# Patient Record
Sex: MALE | Race: WHITE
[De-identification: names, ages, dates, MRNs, and addresses within clinical notes are randomized per-mention and may not be internally consistent; named-entity substitution may affect disease eponyms.]

---

## 2019-02-14 ENCOUNTER — HOSPITAL ENCOUNTER (EMERGENCY)
Dept: HOSPITAL 19 - COL.ER | Age: 34
Discharge: HOME | End: 2019-02-14
Payer: SELF-PAY

## 2019-02-14 VITALS — TEMPERATURE: 98.9 F

## 2019-02-14 VITALS — HEIGHT: 70.98 IN | WEIGHT: 166.45 LBS | BODY MASS INDEX: 23.3 KG/M2

## 2019-02-14 VITALS — SYSTOLIC BLOOD PRESSURE: 131 MMHG | HEART RATE: 85 BPM | DIASTOLIC BLOOD PRESSURE: 73 MMHG

## 2019-02-14 DIAGNOSIS — F17.220: ICD-10-CM

## 2019-02-14 DIAGNOSIS — Z23: ICD-10-CM

## 2019-02-14 DIAGNOSIS — S81.012A: ICD-10-CM
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
## 2019-02-14 DIAGNOSIS — S71.111A: Primary | ICD-10-CM
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
## 2019-02-24 ENCOUNTER — HOSPITAL ENCOUNTER (EMERGENCY)
Dept: HOSPITAL 19 - COL.ER | Age: 34
Discharge: HOME | End: 2019-02-24
Payer: SELF-PAY
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
## 2019-02-24 DIAGNOSIS — S71.111D: Primary | ICD-10-CM

## 2020-10-16 ENCOUNTER — HOSPITAL ENCOUNTER (OUTPATIENT)
Dept: HOSPITAL 19 - COL.RAD | Age: 35
End: 2020-10-16
Attending: FAMILY MEDICINE
Payer: COMMERCIAL

## 2020-10-16 DIAGNOSIS — N43.3: Primary | ICD-10-CM

## 2021-04-19 ENCOUNTER — HOSPITAL ENCOUNTER (EMERGENCY)
Age: 36
Discharge: HOME | End: 2021-04-20

## 2021-04-19 VITALS
HEIGHT: 70 IN | SYSTOLIC BLOOD PRESSURE: 137 MMHG | HEART RATE: 83 BPM | TEMPERATURE: 98.2 F | WEIGHT: 190 LBS | DIASTOLIC BLOOD PRESSURE: 90 MMHG | OXYGEN SATURATION: 96 % | BODY MASS INDEX: 27.2 KG/M2 | RESPIRATION RATE: 18 BRPM

## 2021-04-19 DIAGNOSIS — K02.9 DENTAL CARIES: Primary | ICD-10-CM

## 2021-04-19 DIAGNOSIS — K04.7 DENTAL ABSCESS: ICD-10-CM

## 2021-04-19 PROCEDURE — 99283 EMERGENCY DEPT VISIT LOW MDM: CPT

## 2021-04-19 RX ORDER — IBUPROFEN 800 MG/1
800 TABLET ORAL EVERY 8 HOURS PRN
Qty: 30 TABLET | Refills: 0 | Status: SHIPPED | OUTPATIENT
Start: 2021-04-19 | End: 2022-10-14

## 2021-04-19 RX ORDER — PENICILLIN V POTASSIUM 250 MG/1
500 TABLET ORAL ONCE
Status: COMPLETED | OUTPATIENT
Start: 2021-04-20 | End: 2021-04-20

## 2021-04-19 RX ORDER — PENICILLIN V POTASSIUM 500 MG/1
500 TABLET ORAL 4 TIMES DAILY
Qty: 28 TABLET | Refills: 0 | Status: SHIPPED | OUTPATIENT
Start: 2021-04-19 | End: 2021-04-26

## 2021-04-19 RX ORDER — IBUPROFEN 800 MG/1
800 TABLET ORAL ONCE
Status: COMPLETED | OUTPATIENT
Start: 2021-04-20 | End: 2021-04-20

## 2021-04-19 ASSESSMENT — PAIN DESCRIPTION - ORIENTATION: ORIENTATION: LEFT

## 2021-04-19 ASSESSMENT — PAIN DESCRIPTION - LOCATION: LOCATION: MOUTH;JAW

## 2021-04-19 ASSESSMENT — PAIN SCALES - GENERAL: PAINLEVEL_OUTOF10: 10

## 2021-04-20 PROCEDURE — 6370000000 HC RX 637 (ALT 250 FOR IP): Performed by: NURSE PRACTITIONER

## 2021-04-20 RX ADMIN — PENICILLIN V POTASIUM 500 MG: 250 TABLET ORAL at 00:01

## 2021-04-20 RX ADMIN — IBUPROFEN 800 MG: 800 TABLET, FILM COATED ORAL at 00:01

## 2021-04-20 ASSESSMENT — PAIN SCALES - GENERAL: PAINLEVEL_OUTOF10: 10

## 2021-04-20 NOTE — ED TRIAGE NOTES
Patient comes to the ED with left sided dental and jaw pain that has been ongoing for a few months now. Patient states that he has been putting off going to the dentist. Patient states the pain as a 10/10 in severity/ Patient reports that nothing has been taken for pain except a few left over amoxicillin. No other complaints at this time.

## 2021-04-21 ASSESSMENT — ENCOUNTER SYMPTOMS
EYE REDNESS: 0
BACK PAIN: 0
NAUSEA: 0
CHEST TIGHTNESS: 0
VOMITING: 0
COUGH: 0
RHINORRHEA: 0

## 2021-04-21 NOTE — ED PROVIDER NOTES
into the lungs every 6 hours as needed for Wheezing., Disp-1 Inhaler, R-3             ALLERGIES     has No Known Allergies. FAMILY HISTORY     He indicated that his mother is alive. He indicated that his father is alive. family history is not on file. SOCIAL HISTORY       Social History     Socioeconomic History    Marital status: Single     Spouse name: Not on file    Number of children: Not on file    Years of education: Not on file    Highest education level: Not on file   Occupational History    Not on file   Social Needs    Financial resource strain: Not on file    Food insecurity     Worry: Not on file     Inability: Not on file    Transportation needs     Medical: Not on file     Non-medical: Not on file   Tobacco Use    Smoking status: Current Some Day Smoker     Types: Cigarettes   Substance and Sexual Activity    Alcohol use: Yes     Comment: rarely    Drug use: No    Sexual activity: Yes     Partners: Female   Lifestyle    Physical activity     Days per week: Not on file     Minutes per session: Not on file    Stress: Not on file   Relationships    Social connections     Talks on phone: Not on file     Gets together: Not on file     Attends [employer] service: Not on file     Active member of club or organization: Not on file     Attends meetings of clubs or organizations: Not on file     Relationship status: Not on file    Intimate partner violence     Fear of current or ex partner: Not on file     Emotionally abused: Not on file     Physically abused: Not on file     Forced sexual activity: Not on file   Other Topics Concern    Not on file   Social History Narrative    Not on file       PHYSICAL EXAM     INITIAL VITALS:  height is 5' 10\" (1.778 m) and weight is 190 lb (86.2 kg). His oral temperature is 98.2 °F (36.8 °C). His blood pressure is 137/90 (abnormal) and his pulse is 83. His respiration is 18 and oxygen saturation is 96%.     Physical Exam  Constitutional: Appearance: He is well-developed. HENT:      Head: Normocephalic and atraumatic. Mouth/Throat:      Dentition: Abnormal dentition. Dental tenderness, gingival swelling and dental caries present. Comments: Extensive dental decay with dental fractures. Erythema at the tooth base with palpable tenderness. Eyes:      Conjunctiva/sclera: Conjunctivae normal.   Cardiovascular:      Rate and Rhythm: Normal rate. Pulmonary:      Effort: Pulmonary effort is normal.   Abdominal:      Palpations: Abdomen is soft. Musculoskeletal: Normal range of motion. Skin:     General: Skin is warm and dry. Capillary Refill: Capillary refill takes less than 2 seconds. Neurological:      Mental Status: He is alert and oriented to person, place, and time. Psychiatric:         Behavior: Behavior normal.         DIFFERENTIAL DIAGNOSIS:   Dental fracture, dental abscess, dental caries. DIAGNOSTIC RESULTS     EKG: All EKG's are interpreted by the Emergency Department Physician who eithersigns or Co-signs this chart in the absence of a cardiologist.        RADIOLOGY: non-plainfilm images(s) such as CT, Ultrasound and MRI are read by the radiologist.  Plain radiographic images are visualized and preliminarily interpreted by the emergency physician unless otherwise stated below. No orders to display         LABS:   Labs Reviewed - No data to display    EMERGENCY DEPARTMENT COURSE:   Vitals:    Vitals:    04/19/21 2318   BP: (!) 137/90   Pulse: 83   Resp: 18   Temp: 98.2 °F (36.8 °C)   TempSrc: Oral   SpO2: 96%   Weight: 190 lb (86.2 kg)   Height: 5' 10\" (1.778 m)         [facility]    Patient was seen in the ER for dental pain. No labs or imaging is necessary. The patient needs to see a dentist.  He has no trismus. Able to handle secretions. No fever. Patient is treated with appropriate oral abx and pain medication.       Medications   penicillin v potassium (VEETID) tablet 500 mg (500 mg Oral Given 4/20/21 0001)   ibuprofen (ADVIL;MOTRIN) tablet 800 mg (800 mg Oral Given 4/20/21 0001)         Patient was seen independently by myself. The patient's final impression and disposition and plan was determined by myself. Strict return precautions and follow up instructions were discussed with the patient prior to discharge, with which the patient agrees. Physical assessment findings, diagnostic testing(s) if applicable, and vital signs reviewed with patient/patient representative. Questions answered. Medications asdirected, including OTC medications for supportive care. Education provided on medications. Differential diagnosis(s) discussed with patient/patient representative. Home care/self care instructions reviewed withpatient/patient representative. Patient is to follow-up with family care provider in 2-3 days if no improvement. Patient is to go to the emergency department if symptoms worsen. Patient/patient representative isaware of care plan, questions answered, verbalizes understanding and is in agreement. CRITICAL CARE:   None    CONSULTS:  None    PROCEDURES:  None    FINAL IMPRESSION     1. Dental caries    2.  Dental abscess          DISPOSITION/PLAN   DISPOSITION Decision To Discharge 04/19/2021 11:55:00 PM      PATIENT REFERREDTO:  DR. Alfonso De Luna OF [address] [place] [facility] [address]  [phone]    Schedule an appointment as soon as possible for a visit in 2 days  For follow up    [facility]  [phone]  Schedule an appointment as soon as possible for a visit in 2 days  For follow up      DISCHARGE MEDICATIONS:  Discharge Medication List as of 4/19/2021 11:58 PM      START taking these medications    Details   penicillin v potassium (VEETID) 500 MG tablet Take 1 tablet by mouth 4 times daily for 7 days, Disp-28 tablet, R-0Print             (Please note that portions of this note were completed with a voice recognition program.  Efforts were made to edit the dictations but occasionally words are mis-transcribed.)         LILIAM Mercado - LILIAM Garnica CNP  04/21/21 9889

## 2021-10-12 ENCOUNTER — HOSPITAL ENCOUNTER (EMERGENCY)
Age: 36
Discharge: HOME | End: 2021-10-12
Payer: COMMERCIAL

## 2021-10-12 VITALS
WEIGHT: 175 LBS | RESPIRATION RATE: 14 BRPM | HEART RATE: 69 BPM | OXYGEN SATURATION: 99 % | DIASTOLIC BLOOD PRESSURE: 71 MMHG | SYSTOLIC BLOOD PRESSURE: 126 MMHG | HEIGHT: 71 IN | TEMPERATURE: 97.9 F | BODY MASS INDEX: 24.5 KG/M2

## 2021-10-12 DIAGNOSIS — J06.9 ACUTE UPPER RESPIRATORY INFECTION: Primary | ICD-10-CM

## 2021-10-12 LAB — SARS-COV-2, NAA: NOT  DETECTED

## 2021-10-12 PROCEDURE — 87635 SARS-COV-2 COVID-19 AMP PRB: CPT

## 2021-10-12 PROCEDURE — 99213 OFFICE O/P EST LOW 20 MIN: CPT

## 2021-10-12 PROCEDURE — 99213 OFFICE O/P EST LOW 20 MIN: CPT | Performed by: NURSE PRACTITIONER

## 2021-10-12 RX ORDER — ESCITALOPRAM OXALATE 10 MG/1
10 TABLET ORAL DAILY
COMMUNITY
End: 2021-11-29 | Stop reason: SDUPTHER

## 2021-10-12 ASSESSMENT — ENCOUNTER SYMPTOMS
SINUS PRESSURE: 0
VOMITING: 1
DIARRHEA: 0
NAUSEA: 1
SHORTNESS OF BREATH: 0
COUGH: 1
SORE THROAT: 0

## 2021-10-12 NOTE — Clinical Note
Bryan Mendiola was seen and treated in our emergency department on 10/12/2021. He may return to work on 10/13/2021. If you have any questions or concerns, please don't hesitate to call.       Tom Luna, APRN - CNP

## 2021-10-12 NOTE — ED NOTES
PT GIVEN DISCHARGE INSTRUCTIONS, VERBALIZES UNDERSTANDING. PT ASSESSMENT UNCHANGED, DISCHARGED IN STABLE CONDITION.         Khadar Bolaños RN  10/12/21 5821

## 2021-10-12 NOTE — ED PROVIDER NOTES
[address]  Urgent Care Encounter       CHIEF COMPLAINT       Chief Complaint   Patient presents with    Concern For COVID-19     girlfriend just tested positive for covid    Cough    Fatigue    Emesis    Taste Change       Nurses Notes reviewed and I agree except as noted in the HPI. HISTORY OF PRESENT ILLNESS   Clarice Mccarty is a 30 y.o. male who presents with concerns for COVID-19. Symptoms started yesterday which include cough, fatigue and alteration in taste. Patient also had 1 episode of emesis. He denies chills but does have some mild body aches. No fever, sore throat or otalgia. States his girlfriend tested positive for Covid yesterday. The history is provided by the patient. REVIEW OF SYSTEMS     Review of Systems   Constitutional: Positive for fatigue. Negative for chills and fever. HENT: Negative for congestion, ear pain, sinus pressure and sore throat. Respiratory: Positive for cough. Negative for shortness of breath. Cardiovascular: Negative for chest pain. Gastrointestinal: Positive for nausea and vomiting. Negative for diarrhea. Musculoskeletal: Positive for myalgias. Skin: Negative for rash. Neurological: Negative for headaches. PAST MEDICAL HISTORY   History reviewed. No pertinent past medical history. SURGICALHISTORY     Patient  has a past surgical history that includes hernia repair.     CURRENT MEDICATIONS       Discharge Medication List as of 10/12/2021  3:49 PM      CONTINUE these medications which have NOT CHANGED    Details   escitalopram (LEXAPRO) 10 MG tablet Take 10 mg by mouth dailyHistorical Med      ibuprofen (ADVIL;MOTRIN) 800 MG tablet Take 1 tablet by mouth every 8 hours as needed for Pain, Disp-30 tablet, R-0Print      traMADol (ULTRAM) 50 MG TABS Take 50 mg by mouth 4 times daily as needed Caution will cause drowsiness, not at work, Disp-12 tablet, R-0      betamethasone valerate (VALISONE) 0.1 % cream Apply topically 2 times daily. , Disp-1 Tube, R-1, Print      albuterol (PROVENTIL HFA) 108 (90 BASE) MCG/ACT inhaler Inhale 2 puffs into the lungs every 6 hours as needed for Wheezing., Disp-1 Inhaler, R-3             ALLERGIES     Patient is has No Known Allergies. Patients   Immunization History   Administered Date(s) Administered    Tdap (Boostrix, Adacel) 12/22/2014       FAMILY HISTORY     Patient's family history is not on file. SOCIAL HISTORY     Patient  reports that he has been smoking cigarettes. He has a 1.50 pack-year smoking history. He has quit using smokeless tobacco. He reports current alcohol use. He reports that he does not use drugs. PHYSICAL EXAM     ED TRIAGE VITALS  BP: 126/71, Temp: 97.9 °F (36.6 °C), Pulse: 69, Resp: 14, SpO2: 99 %,Estimated body mass index is 24.41 kg/m² as calculated from the following:    Height as of this encounter: 5' 11\" (1.803 m). Weight as of this encounter: 175 lb (79.4 kg). ,No LMP for male patient. Physical Exam  Vitals and nursing note reviewed. Constitutional:       General: He is not in acute distress. Appearance: He is well-developed. He is not ill-appearing. HENT:      Head: Normocephalic and atraumatic. Right Ear: Tympanic membrane and ear canal normal.      Left Ear: Tympanic membrane and ear canal normal.      Mouth/Throat:      Lips: Pink. Mouth: Mucous membranes are moist.      Pharynx: Oropharynx is clear. Uvula midline. No posterior oropharyngeal erythema. Eyes:      General: Lids are normal. No scleral icterus. Conjunctiva/sclera: Conjunctivae normal.      Pupils: Pupils are equal.   Cardiovascular:      Rate and Rhythm: Normal rate and regular rhythm. Heart sounds: Normal heart sounds, S1 normal and S2 normal.   Pulmonary:      Effort: Pulmonary effort is normal. No respiratory distress. Breath sounds: Normal breath sounds.    Musculoskeletal:      Comments: Normal active ROM x 4 extremities  Gait steady Lymphadenopathy:      Comments: No head or neck adenopathy   Skin:     General: Skin is warm and dry. Findings: No rash (to exposed skin). Nails: There is no clubbing. Neurological:      General: No focal deficit present. Mental Status: He is alert and oriented to person, place, and time. Sensory: No sensory deficit. Comments: Answers questions readily and appropriately   Psychiatric:         Mood and Affect: Mood normal.         Speech: Speech normal.         Behavior: Behavior normal. Behavior is cooperative. DIAGNOSTIC RESULTS     Labs:  Results for orders placed or performed during the hospital encounter of 10/12/21   COVID-19, Rapid   Result Value Ref Range    SARS-CoV-2, [place] NOT  DETECTED NOT DETECTED       IMAGING:    No orders to display         EKG:      URGENT CARE COURSE:     Vitals:    10/12/21 1510   BP: 126/71   Pulse: 69   Resp: 14   Temp: 97.9 °F (36.6 °C)   TempSrc: Temporal   SpO2: 99%   Weight: 175 lb (79.4 kg)   Height: 5' 11\" (1.803 m)       Medications - No data to display         PROCEDURES:  None    FINAL IMPRESSION      1. Acute upper respiratory infection          DISPOSITION/ PLAN     Patient with acute upper respiratory infection, most likely viral in etiology. Rapid Covid test was negative. Patient can treat with over-the-counter medications as desired for symptom management. Recommend avoiding close contact with his girlfriend as much as possible. Should both wear masks if around each other in the home. Follow-up with family doctor with any concerns. Can also contact the health department with any concerns regarding need for quarantine. Further instructions were outlined verbally and in the patient's discharge instructions. All the patient's questions were answered. The patient/parent agreed with the plan and was discharged from the [facility] in good condition. PATIENT REFERRED TO:  No primary care provider on file.   No primary physician

## 2021-11-29 ENCOUNTER — OFFICE VISIT (OUTPATIENT)
Dept: FAMILY MEDICINE CLINIC | Age: 36
End: 2021-11-29
Payer: COMMERCIAL

## 2021-11-29 VITALS
BODY MASS INDEX: 23.68 KG/M2 | SYSTOLIC BLOOD PRESSURE: 132 MMHG | OXYGEN SATURATION: 98 % | WEIGHT: 165.4 LBS | HEART RATE: 68 BPM | DIASTOLIC BLOOD PRESSURE: 78 MMHG | TEMPERATURE: 96.8 F | HEIGHT: 70 IN

## 2021-11-29 DIAGNOSIS — F41.9 ANXIETY AND DEPRESSION: Primary | ICD-10-CM

## 2021-11-29 DIAGNOSIS — R45.1 AGITATION: ICD-10-CM

## 2021-11-29 DIAGNOSIS — F32.A ANXIETY AND DEPRESSION: Primary | ICD-10-CM

## 2021-11-29 DIAGNOSIS — G43.019 INTRACTABLE MIGRAINE WITHOUT AURA AND WITHOUT STATUS MIGRAINOSUS: ICD-10-CM

## 2021-11-29 PROCEDURE — 99204 OFFICE O/P NEW MOD 45 MIN: CPT | Performed by: NURSE PRACTITIONER

## 2021-11-29 RX ORDER — ESCITALOPRAM OXALATE 10 MG/1
10 TABLET ORAL DAILY
Qty: 30 TABLET | Refills: 0 | Status: SHIPPED | OUTPATIENT
Start: 2021-11-29 | End: 2022-01-05 | Stop reason: SDUPTHER

## 2021-11-29 RX ORDER — ARIPIPRAZOLE 15 MG/1
15 TABLET ORAL DAILY
COMMUNITY
End: 2021-11-29 | Stop reason: SDUPTHER

## 2021-11-29 RX ORDER — ARIPIPRAZOLE 15 MG/1
15 TABLET ORAL DAILY
Qty: 30 TABLET | Refills: 0 | Status: SHIPPED | OUTPATIENT
Start: 2021-11-29 | End: 2022-01-05 | Stop reason: SDUPTHER

## 2021-11-29 SDOH — ECONOMIC STABILITY: FOOD INSECURITY: WITHIN THE PAST 12 MONTHS, YOU WORRIED THAT YOUR FOOD WOULD RUN OUT BEFORE YOU GOT MONEY TO BUY MORE.: NEVER TRUE

## 2021-11-29 SDOH — ECONOMIC STABILITY: FOOD INSECURITY: WITHIN THE PAST 12 MONTHS, THE FOOD YOU BOUGHT JUST DIDN'T LAST AND YOU DIDN'T HAVE MONEY TO GET MORE.: NEVER TRUE

## 2021-11-29 ASSESSMENT — PATIENT HEALTH QUESTIONNAIRE - PHQ9
2. FEELING DOWN, DEPRESSED OR HOPELESS: 1
SUM OF ALL RESPONSES TO PHQ QUESTIONS 1-9: 1
1. LITTLE INTEREST OR PLEASURE IN DOING THINGS: 0
SUM OF ALL RESPONSES TO PHQ QUESTIONS 1-9: 1
SUM OF ALL RESPONSES TO PHQ9 QUESTIONS 1 & 2: 1
SUM OF ALL RESPONSES TO PHQ QUESTIONS 1-9: 1

## 2021-11-29 ASSESSMENT — ENCOUNTER SYMPTOMS
COLOR CHANGE: 0
WHEEZING: 0
BACK PAIN: 0
BLOOD IN STOOL: 0
EYE DISCHARGE: 0
SINUS PRESSURE: 0
COUGH: 0
ABDOMINAL PAIN: 0
CONSTIPATION: 0
EYE ITCHING: 0
SHORTNESS OF BREATH: 0
DIARRHEA: 0
EYE PAIN: 0
EYE REDNESS: 0

## 2021-11-29 ASSESSMENT — SOCIAL DETERMINANTS OF HEALTH (SDOH): HOW HARD IS IT FOR YOU TO PAY FOR THE VERY BASICS LIKE FOOD, HOUSING, MEDICAL CARE, AND HEATING?: NOT HARD AT ALL

## 2021-11-29 NOTE — PROGRESS NOTES
[facility]  [facility]  [address] [facility]  Dept: [phone]  Dept Fax: [phone]: [phone]     Visit Date:  11/29/2021    Patient:  Jose Cabezas  YOB: 1985  Age: 29 y.o. Gender: male  BMI: Body mass index is 23.73 kg/m². Jose Cabezas, New patient, is being seen today for   Chief Complaint   Patient presents with    New Patient    Medication Refill   . Assessment/Plan      1. Anxiety and depression  - Chronic, unstable  - Restart aripiprazole and escitalopram at previous doses  - Consider dose adjustment during 1 month follow up  - ARIPiprazole (ABILIFY) 15 MG tablet; Take 1 tablet by mouth daily  Dispense: 30 tablet; Refill: 0  - escitalopram (LEXAPRO) 10 MG tablet; Take 1 tablet by mouth daily  Dispense: 30 tablet; Refill: 0    2. Agitation  - Chronic, unstable  - Restart aripiprazole and escitalopram at previous doses  - Consider dose adjustment during 1 month follow up  - ARIPiprazole (ABILIFY) 15 MG tablet; Take 1 tablet by mouth daily  Dispense: 30 tablet; Refill: 0    3. Intractable migraine without aura and without status migrainosus  - No occurring frequently enough for prophylactic treatment  - Recommend use of Excedrin at the start of headache symptoms  - Avoid triggers as able  - Encourage adequate sleep and fluid intake, as well as regular meals      Return in about 1 month (around 12/29/2021) for Anxiety, Depression. HPI:     New patient. Previously seeing [facility]. Recently moved from [place]. Working in a factory. Needs medication refills of abilify and lexapro. Stopped medications 2 weeks ago after running out. Reports mood swings, agitation, anxiety and depression. Reports previously being dx with depression and anxiety. States he has never been diagnosed with bipolar disorder but reports associated tendencies. Reports stability of symptoms while taking his medications. Reports headaches occurring twice monthly. Frontal. Bilateral. Associated with sensitivity to light and sound. PHQ-9 Total Score: 1 (11/29/2021 11:34 AM)      Medications    Current Outpatient Medications:     ARIPiprazole (ABILIFY) 15 MG tablet, Take 1 tablet by mouth daily, Disp: 30 tablet, Rfl: 0    escitalopram (LEXAPRO) 10 MG tablet, Take 1 tablet by mouth daily, Disp: 30 tablet, Rfl: 0    ibuprofen (ADVIL;MOTRIN) 800 MG tablet, Take 1 tablet by mouth every 8 hours as needed for Pain, Disp: 30 tablet, Rfl: 0    The patient has No Known Allergies. Past Medical History  Janis Leavitt  has no past medical history on file. Past Surgical History  The patient  has a past surgical history that includes hernia repair. Family History  This patient's family history includes Cancer in his maternal aunt and maternal grandfather; Other in his maternal grandmother and maternal uncle. Social History  Janis Leavitt  reports that he has been smoking cigarettes. He started smoking about 2 years ago. He has a 1.50 pack-year smoking history. He has quit using smokeless tobacco. He reports current alcohol use of about 2.0 standard drinks of alcohol per week. He reports current drug use. Drug: Marijuana Valdene Dominguez). Health Maintenance:    Health maintenance reviewed. Health Maintenance   Topic Date Due    Hepatitis C screen  Never done    Varicella vaccine (1 of 2 - 2-dose childhood series) Never done    COVID-19 Vaccine (1) Never done    Pneumococcal 0-64 years Vaccine (1 of 2 - PPSV23) Never done    HIV screen  Never done    Flu vaccine (1) Never done    DTaP/Tdap/Td vaccine (2 - Td or Tdap) 12/22/2024    Hepatitis A vaccine  Aged Out    Hepatitis B vaccine  Aged Out    Hib vaccine  Aged Out    Meningococcal (ACWY) vaccine  Aged Out       Subjective/Objective:      Review of Systems   Constitutional: Negative for chills, fatigue and fever. HENT: Negative for congestion, ear pain, sinus pressure and sneezing. Eyes: Negative for pain, discharge, redness and itching. Respiratory: Negative for cough, shortness of breath and wheezing. Cardiovascular: Negative for chest pain, palpitations and leg swelling. Gastrointestinal: Negative for abdominal pain, blood in stool, constipation and diarrhea. Endocrine: Negative for polydipsia, polyphagia and polyuria. Genitourinary: Negative for difficulty urinating and hematuria. Musculoskeletal: Negative for arthralgias, back pain and neck pain. Skin: Negative for color change, pallor and rash. Allergic/Immunologic: Negative for environmental allergies and food allergies. Neurological: Positive for headaches. Negative for dizziness, light-headedness and numbness. Psychiatric/Behavioral: Positive for agitation and dysphoric mood. Negative for confusion. The patient is nervous/anxious. /78 (Site: Left Upper Arm, Position: Sitting)   Pulse 68   Temp 96.8 °F (36 °C)   Ht 5' 10\" (1.778 m)   Wt 165 lb 6.4 oz (75 kg)   SpO2 98%   BMI 23.73 kg/m²     Physical Exam  Vitals and nursing note reviewed. Constitutional:       Appearance: He is well-developed. HENT:      Head: Normocephalic and atraumatic. Right Ear: Hearing, tympanic membrane, ear canal and external ear normal.      Left Ear: Hearing, tympanic membrane, ear canal and external ear normal.      Nose:      Right Sinus: No maxillary sinus tenderness or frontal sinus tenderness. Left Sinus: No maxillary sinus tenderness or frontal sinus tenderness. Eyes:      General:         Right eye: No discharge. Left eye: No discharge. Conjunctiva/sclera: Conjunctivae normal.      Pupils: Pupils are equal, round, and reactive to light. Neck:      Vascular: No JVD. Cardiovascular:      Rate and Rhythm: Normal rate and regular rhythm. Heart sounds: Normal heart sounds. No murmur heard. Pulmonary:      Effort: Pulmonary effort is normal. No tachypnea.       Breath sounds: Normal breath sounds. No stridor. No wheezing. Abdominal:      General: There is no distension. Tenderness: There is no abdominal tenderness. Musculoskeletal:         General: No deformity. Cervical back: Normal range of motion. Comments: ROM in all extremities WNL. No deformities. Lymphadenopathy:      Head:      Right side of head: No submental or submandibular adenopathy. Left side of head: No submental or submandibular adenopathy. Skin:     General: Skin is warm and dry. Capillary Refill: Capillary refill takes less than 2 seconds. Findings: No rash. Neurological:      Mental Status: He is alert and oriented to person, place, and time. Coordination: Coordination normal.   Psychiatric:         Mood and Affect: Mood is anxious. Behavior: Behavior normal.         Thought Content: Thought content normal.         Judgment: Judgment normal.           Labs Reviewed 11/29/2021:    Lab Results   Component Value Date    WBC 7.7 11/20/2013    HGB 15.8 11/20/2013    HCT 46.5 11/20/2013     11/20/2013     11/20/2013    K 4.6 11/20/2013     11/20/2013    CREATININE 1.0 11/20/2013    BUN 10 11/20/2013    CO2 29 11/20/2013           Patient given educational materials - see patient instructions. Discussed use, benefit, and side effects of prescribed medications. All patient questions answered. Pt voiced understanding. Reviewed health maintenance.        Electronically signed by LILIAM Hammond CNP on 11/29/2021 at 11:45 AM EST

## 2021-11-29 NOTE — PATIENT INSTRUCTIONS
Patient Education        Anxiety Disorder: Care Instructions  Your Care Instructions     Anxiety is a normal reaction to stress. Difficult situations can cause you to have symptoms such as sweaty palms and a nervous feeling. In an anxiety disorder, the symptoms are far more severe. Constant worry, muscle tension, trouble sleeping, nausea and diarrhea, and other symptoms can make normal daily activities difficult or impossible. These symptoms may occur for no reason, and they can affect your work, school, or social life. Medicines, counseling, and self-care can all help. Follow-up care is a key part of your treatment and safety. Be sure to make and go to all appointments, and call your doctor if you are having problems. It's also a good idea to know your test results and keep a list of the medicines you take. How can you care for yourself at home? · Take medicines exactly as directed. Call your doctor if you think you are having a problem with your medicine. · Go to your counseling sessions and follow-up appointments. · Recognize and accept your anxiety. Then, when you are in a situation that makes you anxious, say to yourself, \"This is not an emergency. I feel uncomfortable, but I am not in danger. I can keep going even if I feel anxious. \"  · Be kind to your body:  ? Relieve tension with exercise or a massage. ? Get enough rest.  ? Avoid alcohol, caffeine, nicotine, and illegal drugs. They can increase your anxiety level and cause sleep problems. ? Learn and do relaxation techniques. See below for more about these techniques. · Engage your mind. Get out and do something you enjoy. Go to a funny movie, or take a walk or hike. Plan your day. Having too much or too little to do can make you anxious. · Keep a record of your symptoms. Discuss your fears with a good friend or family member, or join a support group for people with similar problems. Talking to others sometimes relieves stress.   · Get involved in social groups, or volunteer to help others. Being alone sometimes makes things seem worse than they are. · Get at least 30 minutes of exercise on most days of the week to relieve stress. Walking is a good choice. You also may want to do other activities, such as running, swimming, cycling, or playing tennis or team sports. Relaxation techniques  Do relaxation exercises 10 to 20 minutes a day. You can play soothing, relaxing music while you do them, if you wish. · Tell others in your house that you are going to do your relaxation exercises. Ask them not to disturb you. · Find a comfortable place, away from all distractions and noise. · Lie down on your back, or sit with your back straight. · Focus on your breathing. Make it slow and steady. · Breathe in through your nose. Breathe out through either your nose or mouth. · Breathe deeply, filling up the area between your navel and your rib cage. Breathe so that your belly goes up and down. · Do not hold your breath. · Breathe like this for 5 to 10 minutes. Notice the feeling of calmness throughout your whole body. As you continue to breathe slowly and deeply, relax by doing the following for another 5 to 10 minutes:  · Tighten and relax each muscle group in your body. You can begin at your toes and work your way up to your head. · Imagine your muscle groups relaxing and becoming heavy. · Empty your mind of all thoughts. · Let yourself relax more and more deeply. · Become aware of the state of calmness that surrounds you. · When your relaxation time is over, you can bring yourself back to alertness by moving your fingers and toes and then your hands and feet and then stretching and moving your entire body. Sometimes people fall asleep during relaxation, but they usually wake up shortly afterward. · Always give yourself time to return to full alertness before you drive a car or do anything that might cause an accident if you are not fully alert.  Never play a relaxation tape while you drive a car. When should you call for help? Call 911 anytime you think you may need emergency care. For example, call if:    · You feel you cannot stop from hurting yourself or someone else. Keep the numbers for these national suicide hotlines: 1-978-893-TALK (2-185.628.9344) and 7-990-VDPXWXP (6-139.582.2037). If you or someone you know talks about suicide or feeling hopeless, get help right away. Watch closely for changes in your health, and be sure to contact your doctor if:    · You have anxiety or fear that affects your life.     · You have symptoms of anxiety that are new or different from those you had before. Where can you learn more? Go to [email or web address]://SamanagepeParcell Laboratorieseb.360incentives.com. org and sign in to your Benaissance account. Enter P754 in the Responsa box to learn more about \"Anxiety Disorder: Care Instructions. \"     If you do not have an account, please click on the \"Sign Up Now\" link. Current as of: September 23, 2020               Content Version: 12.9  © 2006-2021 ParAccel. Care instructions adapted under license by [facility] ([facility]). If you have questions about a medical condition or this instruction, always ask your healthcare professional. [address] any warranty or liability for your use of this information. Patient Education        Depression and Chronic Disease: Care Instructions  Your Care Instructions     A chronic disease is one that you have for a long time. Some chronic diseases can be controlled, but they usually cannot be cured. Depression is common in people with chronic diseases, but it often goes unnoticed. Many people have concerns about seeking treatment for a mental health problem. You may think it's a sign of weakness, or you don't want people to know about it. It's important to overcome these reasons for not seeking treatment.  Treating depression or anxiety is good for your health. Follow-up care is a key part of your treatment and safety. Be sure to make and go to all appointments, and call your doctor if you are having problems. It's also a good idea to know your test results and keep a list of the medicines you take. How can you care for yourself at home? Watch for symptoms of depression  The symptoms of depression are often subtle at first. You may think they are caused by your disease rather than depression. Or you may think it is normal to be depressed when you have a chronic disease. If you are depressed you may:  · Feel sad or hopeless. · Feel guilty or worthless. · Not enjoy the things you used to enjoy. · Feel hopeless, as though life is not worth living. · Have trouble thinking or remembering. · Have low energy, and you may not eat or sleep well. · Pull away from others. · Think often about death or killing yourself. (Keep the numbers for these national suicide hotlines: 8-174-165-TALK [1-646.400.3160] and 2-302-SARBSNN [1-447.788.2668]. )  Get treatment  By treating your depression, you can feel more hopeful and have more energy. If you feel better, you may take better care of yourself, so your health may improve. · Talk to your doctor if you have any changes in mood during treatment for your disease. · Ask your doctor for help. Counseling, antidepressant medicine, or a combination of the two can help most people with depression. Often a combination works best. Counseling can also help you cope with having a chronic disease. When should you call for help? Call 911 anytime you think you may need emergency care. For example, call if:    · You feel like hurting yourself or someone else.     · Someone you know has depression and is about to attempt or is attempting suicide. Call your doctor now or seek immediate medical care if:    · You hear voices.     · Someone you know has depression and:  ? Starts to give away his or her possessions. ?  Uses illegal drugs or drinks alcohol heavily. ? Talks or writes about death, including writing suicide notes or talking about guns, knives, or pills. ? Starts to spend a lot of time alone. ? Acts very aggressively or suddenly appears calm. Watch closely for changes in your health, and be sure to contact your doctor if:    · You do not get better as expected. Where can you learn more? Go to https://QoturepeGetting-inewCollegeMapper.GnamGnam. org and sign in to your [email or web address] account. Enter Q328 in the Ryonet box to learn more about \"Depression and Chronic Disease: Care Instructions. \"     If you do not have an account, please click on the \"Sign Up Now\" link. Current as of: June 16, 2021               Content Version: 13.0  © 4469-8583 Contur. Care instructions adapted under license by [facility] ([facility]). If you have questions about a medical condition or this instruction, always ask your healthcare professional. [place] [address] any warranty or liability for your use of this information. Patient Education        Bipolar Disorder: Care Instructions  Your Care Instructions     Bipolar disorder is an illness that causes extreme mood changes, from times of very high energy (manic episodes) to times of depression. But many people with bipolar disorder show only the symptoms of depression. These moods may cause problems with your work, school, family life, friendships, and how well you function. This disease is also called manic-depression. There is no cure for bipolar disorder, but it can be helped with medicines. Counseling may also help. It is important to take your medicines exactly as prescribed, even when you feel well. You may need lifelong treatment. Follow-up care is a key part of your treatment and safety. Be sure to make and go to all appointments, and call your doctor if you are having problems.  It's also a good idea to know your test results and keep a list of the medicines you take.  How can you care for yourself at home? · Be safe with medicines. Take your medicines exactly as prescribed. Do not stop or change a medicine without talking to your doctor first. Tommy Sierra and your doctor may need to try different combinations of medicines to find what works for you. · Take your medicines on schedule to keep your moods even. When you feel good, you may think that you do not need your medicines. But it is important to keep taking them. · Go to your counseling sessions. Call and talk with your counselor if you can't go to a session or if you don't think the sessions are helping. Do not just stop going. · Get at least 30 minutes of activity on most days of the week. Walking is a good choice. You also may want to do other things, such as running, swimming, or cycling. · Get enough sleep. Keep your room dark and quiet. Try to go to bed at the same time every night. · Eat a healthy diet. This includes whole grains, dairy, fruits, vegetables, and protein. Eat foods from each of these groups. · Try to lower your stress. Manage your time, build a strong support system, and lead a healthy lifestyle. To lower your stress, try physical activity, slow deep breathing, or getting a massage. · Do not use alcohol, marijuana, or illegal drugs. · Learn the early signs of your mood changes. You can then take steps to help yourself feel better. · Ask for help from friends and family when you need it. You may need help with daily chores when you are depressed. When you are manic, you may need support to control your high energy levels. What should you do if someone in your family has bipolar disorder? · Learn about the disease and signs it's getting worse. · Remind your family member you love them. · Make a plan with all family members about how to take care of your loved one when symptoms are bad. · Remind yourself it will take time for changes to occur.   · Try not to blame yourself for the disease. · Know your legal rights and the legal rights of your family member. Support groups or counselors can help with this information. · Take care of yourself. Keep up with your interests, such as career, hobbies, and friends. Use exercise, positive self-talk, deep breathing, and other relaxing exercises to help lower your stress. · Give yourself time to grieve. You may need to deal with emotions such as anger, fear, and frustration. · If you are having a hard time with your feelings or with your relationship with your family member, talk with a counselor. When should you call for help? Call 911 anytime you think you may need emergency care. For example, call if:    · You feel like hurting yourself or someone else.     · Someone who has bipolar disorder displays dangerous behavior, and you think the person might hurt himself or herself or someone else. Call your doctor now or seek immediate medical care if:    · You hear voices.     · Someone you know has bipolar disorder and talks about suicide. Keep the numbers for these national suicide hotlines: 0-831-228-TALK (4-675.196.6660) and 8-022-GODOLFA (5-852.288.4087). If a suicide threat seems real, with a specific plan and a way to carry it out, stay with the person, or ask someone you trust to stay with the person, until you can get help.     · Someone you know has bipolar disorder and:  ? Starts to give away possessions. ? Is using illegal drugs or drinking alcohol heavily. ? Talks or writes about death, including writing suicide notes or talking about guns, knives, or pills. ? Talks or writes about hurting someone else. ? Starts to spend a lot of time alone. ? Acts very aggressively or suddenly appears calm. ? Talks about beliefs that are not based in reality (delusions). Watch closely for changes in your health, and be sure to contact your doctor if:    · You cannot go to your counseling sessions. Where can you learn more?   Go to [email or web address]://[email or web address] and sign in to your Yan Engines account. Enter K052 in the [facility] box to learn more about \"Bipolar Disorder: Care Instructions. \"     If you do not have an account, please click on the \"Sign Up Now\" link. Current as of: June 16, 2021               Content Version: 13.0  © 2006-2021 studdex. Care instructions adapted under license by [facility] ([facility]). If you have questions about a medical condition or this instruction, always ask your healthcare professional. [place] [address] any warranty or liability for your use of this information. Patient Education        Migraine Headache: Care Instructions  Overview     Migraines are painful, throbbing headaches that often start on one side of the head. They may cause nausea and vomiting and make you sensitive to light, sound, or smell. Without treatment, migraines can last from 4 hours to a few days. Medicines can help prevent migraines or stop them after they have started. Your doctor can help you find which ones work best for you. Follow-up care is a key part of your treatment and safety. Be sure to make and go to all appointments, and call your doctor if you are having problems. It's also a good idea to know your test results and keep a list of the medicines you take. How can you care for yourself at home? · Do not drive if you have taken a prescription pain medicine. · Rest in a quiet, dark room until your headache is gone. Close your eyes, and try to relax or go to sleep. Don't watch TV or read. · Put a cold, moist cloth or cold pack on the painful area for 10 to 20 minutes at a time. Put a thin cloth between the cold pack and your skin. · Use a warm, moist towel or a heating pad set on low to relax tight shoulder and neck muscles. · Have someone gently massage your neck and shoulders. · Take your medicines exactly as prescribed.  Call your doctor if you think you are having a problem with your medicine. You will get more details on the specific medicines your doctor prescribes. · Don't take medicine for headache pain too often. Talk to your doctor if you are taking medicine more than 2 days a week to stop a headache. Taking too much pain medicine can lead to more headaches. These are called medicine-overuse headaches. To prevent migraines  · Keep a headache diary so you can figure out what triggers your headaches. Avoiding triggers may help you prevent headaches. Record when each headache began, how long it lasted, and what the pain was like. Write down any other symptoms you had with the headache, such as nausea, flashing lights or dark spots, or sensitivity to bright light or loud noise. Note if the headache occurred near your period. List anything that might have triggered the headache. Triggers may include certain foods (chocolate, cheese, wine) or odors, smoke, bright light, stress, or lack of sleep. · If your doctor has prescribed medicine for your migraines, take it as directed. You may have medicine that you take only when you get a migraine and medicine that you take all the time to help prevent migraines. ? If your doctor has prescribed medicine for when you get a headache, take it at the first sign of a migraine, unless your doctor has given you other instructions. ? If your doctor has prescribed medicine to prevent migraines, take it exactly as prescribed. Call your doctor if you think you are having a problem with your medicine. · Find healthy ways to deal with stress. Migraines are most common during or right after stressful times. Try finding ways to reduce stress like practicing mindfulness or deep breathing exercises. · Get plenty of sleep and exercise. But be careful to not push yourself too hard during exercise. It may trigger a headache. · Eat meals on a regular schedule. Avoid foods and drinks that often trigger migraines.  These include chocolate, alcohol (especially red wine and port), aspartame, monosodium glutamate (MSG), and some additives found in foods (such as hot dogs, bajwa, cold cuts, aged cheeses, and pickled foods). · Limit caffeine. Don't drink too much coffee, tea, or soda. But don't quit caffeine suddenly. That can also give you migraines. · Do not smoke or allow others to smoke around you. If you need help quitting, talk to your doctor about stop-smoking programs and medicines. These can increase your chances of quitting for good. · If you are taking birth control pills or hormone therapy, talk to your doctor about whether they are triggering your migraines. When should you call for help? Call 911 anytime you think you may need emergency care. For example, call if:    · You have signs of a stroke. These may include:  ? Sudden numbness, paralysis, or weakness in your face, arm, or leg, especially on only one side of your body. ? Sudden vision changes. ? Sudden trouble speaking. ? Sudden confusion or trouble understanding simple statements. ? Sudden problems with walking or balance. ? A sudden, severe headache that is different from past headaches. Call your doctor now or seek immediate medical care if:    · You have new or worse nausea and vomiting.     · You have a new or higher fever.     · Your headache gets much worse. Watch closely for changes in your health, and be sure to contact your doctor if:    · You are not getting better after 2 days (48 hours). Where can you learn more? Go to https://IbottapeRV ID.[email or web address] org and sign in to your Magnitude Software account. Enter B103 in the [facility] box to learn more about \"Migraine Headache: Care Instructions. \"     If you do not have an account, please click on the \"Sign Up Now\" link. Current as of: April 8, 2021               Content Version: 13.0  © 9328-5208 Healthwise, Incorporated. Care instructions adapted under license by [facility] ([facility]).  If you have questions about a medical condition or this instruction, always ask your healthcare professional. [address] any warranty or liability for your use of this information. Patient Education        Recurring Migraine Headache: Care Instructions  Overview  Migraines are painful, throbbing headaches. They often start on one side of the head. They may cause nausea and vomiting and make you sensitive to light, sound, or smell. Some people may have only a few migraines throughout life. Others have them as often as several times a month. The goal of treatment is to reduce the number of migraines you have and relieve your symptoms. Even with treatment, you may continue to have migraines. You play an important role in dealing with your headaches. Work on avoiding things that seem to trigger your migraines. When you feel a headache coming on, act quickly to stop it before it gets worse. Follow-up care is a key part of your treatment and safety. Be sure to make and go to all appointments, and call your doctor if you are having problems. It's also a good idea to know your test results and keep a list of the medicines you take. How can you care for yourself at home? · Do not drive if you have taken a prescription pain medicine. · Rest in a quiet, dark room until your headache is gone. Close your eyes and try to relax or go to sleep. Do not watch TV or read. · Put a cold, moist cloth or cold pack on the painful area for 10 to 20 minutes at a time. Put a thin cloth between the cold pack and your skin. · Have someone gently massage your neck and shoulders. · Take your medicines exactly as prescribed. Call your doctor if you think you are having a problem with your medicine. You will get more details on the specific medicines your doctor prescribes. · Don't take medicine for headache pain too often. Talk to your doctor if you are taking medicine more than 2 days a week to stop a headache.  Taking too much not drinking too much coffee, tea, or soda. Do not quit caffeine suddenly, because that can also give you migraines. · Do not smoke or allow others to smoke around you. If you need help quitting, talk to your doctor about stop-smoking programs and medicines. These can increase your chances of quitting for good. · If you are taking birth control pills or hormone therapy, talk to your doctor about whether they are triggering your migraines. When should you call for help? Call 911 anytime you think you may need emergency care. For example, call if:    · You have symptoms of a stroke. These may include:  ? Sudden numbness, tingling, weakness, or loss of movement in your face, arm, or leg, especially on only one side of your body. ? Sudden vision changes. ? Sudden trouble speaking. ? Sudden confusion or trouble understanding simple statements. ? Sudden problems with walking or balance. ? A sudden, severe headache that is different from past headaches. Call your doctor now or seek immediate medical care if:    · You develop a fever and a stiff neck.     · You have new nausea and vomiting, or you cannot keep down food or liquids. Watch closely for changes in your health, and be sure to contact your doctor if:    · You have a headache that does not get better within 1 or 2 days.     · Your headaches get worse or happen more often. Where can you learn more? Go to https://Daleelisteffanieeb.FatSkunk. org and sign in to your siOPTICA account. Enter  in the [facility] box to learn more about \"Recurring Migraine Headache: Care Instructions. \"     If you do not have an account, please click on the \"Sign Up Now\" link. Current as of: April 8, 2021               Content Version: 13.0  © 7774-5717 Healthwise, Incorporated. Care instructions adapted under license by [facility] ([facility]).  If you have questions about a medical condition or this instruction, always ask your healthcare professional. [employer] disclaims any warranty or liability for your use of this information.

## 2021-12-15 ENCOUNTER — HOSPITAL ENCOUNTER (EMERGENCY)
Age: 36
Discharge: HOME | End: 2021-12-15
Payer: COMMERCIAL

## 2021-12-15 VITALS
HEIGHT: 70 IN | BODY MASS INDEX: 23.62 KG/M2 | RESPIRATION RATE: 14 BRPM | DIASTOLIC BLOOD PRESSURE: 81 MMHG | SYSTOLIC BLOOD PRESSURE: 130 MMHG | WEIGHT: 165 LBS | HEART RATE: 86 BPM | OXYGEN SATURATION: 97 % | TEMPERATURE: 97.8 F

## 2021-12-15 DIAGNOSIS — K21.9 CHRONIC GERD: ICD-10-CM

## 2021-12-15 DIAGNOSIS — R19.7 NAUSEA VOMITING AND DIARRHEA: Primary | ICD-10-CM

## 2021-12-15 DIAGNOSIS — J01.00 ACUTE MAXILLARY SINUSITIS, RECURRENCE NOT SPECIFIED: ICD-10-CM

## 2021-12-15 DIAGNOSIS — R11.2 NAUSEA VOMITING AND DIARRHEA: Primary | ICD-10-CM

## 2021-12-15 DIAGNOSIS — B37.0 THRUSH, ORAL: ICD-10-CM

## 2021-12-15 LAB
FLU A ANTIGEN: NEGATIVE
FLU B ANTIGEN: NEGATIVE
SARS-COV-2, NAA: NOT  DETECTED

## 2021-12-15 PROCEDURE — 99213 OFFICE O/P EST LOW 20 MIN: CPT | Performed by: NURSE PRACTITIONER

## 2021-12-15 PROCEDURE — 87804 INFLUENZA ASSAY W/OPTIC: CPT

## 2021-12-15 PROCEDURE — 87635 SARS-COV-2 COVID-19 AMP PRB: CPT

## 2021-12-15 PROCEDURE — 99213 OFFICE O/P EST LOW 20 MIN: CPT

## 2021-12-15 RX ORDER — OMEPRAZOLE 20 MG/1
20 CAPSULE, DELAYED RELEASE ORAL
Qty: 30 CAPSULE | Refills: 0 | Status: SHIPPED | OUTPATIENT
Start: 2021-12-15 | End: 2022-01-06 | Stop reason: DRUGHIGH

## 2021-12-15 RX ORDER — ONDANSETRON 4 MG/1
4 TABLET, ORALLY DISINTEGRATING ORAL EVERY 8 HOURS PRN
Qty: 40 TABLET | Refills: 0 | Status: SHIPPED | OUTPATIENT
Start: 2021-12-15 | End: 2022-04-20

## 2021-12-15 RX ORDER — BROMPHENIRAMINE MALEATE, PSEUDOEPHEDRINE HYDROCHLORIDE, AND DEXTROMETHORPHAN HYDROBROMIDE 2; 30; 10 MG/5ML; MG/5ML; MG/5ML
5 SYRUP ORAL 4 TIMES DAILY PRN
Qty: 118 ML | Refills: 0 | Status: SHIPPED | OUTPATIENT
Start: 2021-12-15 | End: 2022-01-06

## 2021-12-15 RX ORDER — FLUTICASONE PROPIONATE 50 MCG
1 SPRAY, SUSPENSION (ML) NASAL DAILY
Qty: 16 G | Refills: 0 | Status: SHIPPED | OUTPATIENT
Start: 2021-12-15 | End: 2022-03-22

## 2021-12-15 RX ORDER — AMOXICILLIN 500 MG/1
500 CAPSULE ORAL 2 TIMES DAILY
Qty: 20 CAPSULE | Refills: 0 | Status: SHIPPED | OUTPATIENT
Start: 2021-12-15 | End: 2021-12-25

## 2021-12-15 RX ORDER — CETIRIZINE HYDROCHLORIDE 10 MG/1
10 TABLET ORAL DAILY
Qty: 30 TABLET | Refills: 0 | Status: SHIPPED | OUTPATIENT
Start: 2021-12-15 | End: 2022-01-06

## 2021-12-15 ASSESSMENT — ENCOUNTER SYMPTOMS
RHINORRHEA: 1
SHORTNESS OF BREATH: 0
VOMITING: 1
COUGH: 1
NAUSEA: 0
DIARRHEA: 1
CHEST TIGHTNESS: 0
SORE THROAT: 0

## 2021-12-15 ASSESSMENT — PAIN DESCRIPTION - LOCATION: LOCATION: HEAD;OTHER (COMMENT)

## 2021-12-15 ASSESSMENT — PAIN - FUNCTIONAL ASSESSMENT: PAIN_FUNCTIONAL_ASSESSMENT: PREVENTS OR INTERFERES SOME ACTIVE ACTIVITIES AND ADLS

## 2021-12-15 ASSESSMENT — PAIN DESCRIPTION - DESCRIPTORS: DESCRIPTORS: ACHING

## 2021-12-15 ASSESSMENT — PAIN DESCRIPTION - PAIN TYPE: TYPE: ACUTE PAIN

## 2021-12-15 ASSESSMENT — PAIN DESCRIPTION - FREQUENCY: FREQUENCY: CONTINUOUS

## 2021-12-15 ASSESSMENT — PAIN SCALES - GENERAL: PAINLEVEL_OUTOF10: 9

## 2021-12-15 ASSESSMENT — PAIN DESCRIPTION - ONSET: ONSET: GRADUAL

## 2021-12-15 ASSESSMENT — PAIN DESCRIPTION - PROGRESSION: CLINICAL_PROGRESSION: GRADUALLY WORSENING

## 2021-12-15 NOTE — ED PROVIDER NOTES
[address]  Urgent Care Encounter       CHIEF COMPLAINT       Chief Complaint   Patient presents with    Cough    Emesis    Concern For COVID-19       Nurses Notes reviewed and I agree except as noted in the HPI. HISTORY OF PRESENT ILLNESS   Jack Kaye is a 29 y.o. male who presents to the [facility] ambulatory care center for evaluation of cough and emesis. He reports his symptoms started roughly 2 to 3 days ago. He reports his symptoms as nasal congestion, rhinorrhea, postnasal drainage, cough, emesis, diarrhea, and hot and cold flashes. He denies dyspnea or pharyngitis. He denies loss of taste or smell. He denies fever. He does report that several people have had Covid at work in the last several weeks. The history is provided by the patient. No  was used. REVIEW OF SYSTEMS     Review of Systems   Constitutional: Positive for chills. Negative for activity change, appetite change, fatigue and fever. HENT: Positive for congestion, postnasal drip and rhinorrhea. Negative for ear discharge, ear pain and sore throat. Respiratory: Positive for cough. Negative for chest tightness and shortness of breath. Cardiovascular: Negative for chest pain. Gastrointestinal: Positive for diarrhea and vomiting. Negative for nausea. Genitourinary: Negative for dysuria. Skin: Negative for rash. Allergic/Immunologic: Negative for environmental allergies and food allergies. Neurological: Negative for dizziness and headaches. PAST MEDICAL HISTORY   History reviewed. No pertinent past medical history. SURGICALHISTORY     Patient  has a past surgical history that includes hernia repair.     CURRENT MEDICATIONS       Discharge Medication List as of 12/15/2021  4:36 PM      CONTINUE these medications which have NOT CHANGED    Details   ARIPiprazole (ABILIFY) 15 MG tablet Take 1 tablet by mouth daily, Disp-30 tablet, R-0Normal      escitalopram (LEXAPRO) 10 MG tablet Take 1 tablet by mouth daily, Disp-30 tablet, R-0Normal      ibuprofen (ADVIL;MOTRIN) 800 MG tablet Take 1 tablet by mouth every 8 hours as needed for Pain, Disp-30 tablet, R-0Print             ALLERGIES     Patient is has No Known Allergies. Patients   Immunization History   Administered Date(s) Administered    Tdap (Boostrix, Adacel) 12/22/2014       FAMILY HISTORY     Patient's family history includes Cancer in his maternal aunt and maternal grandfather; Other in his maternal grandmother and maternal uncle. SOCIAL HISTORY     Patient  reports that he has been smoking cigarettes and cigars. He started smoking about 2 years ago. He has a 1.50 pack-year smoking history. He has quit using smokeless tobacco. He reports current alcohol use of about 2.0 standard drinks of alcohol per week. He reports previous drug use. Drug: Marijuana Clinta Marcell). PHYSICAL EXAM     ED TRIAGE VITALS  BP: 130/81, Temp: 97.8 °F (36.6 °C), Pulse: 86, Resp: 14, SpO2: 97 %,Estimated body mass index is 23.68 kg/m² as calculated from the following:    Height as of this encounter: 5' 10\" (1.778 m). Weight as of this encounter: 165 lb (74.8 kg). ,No LMP for male patient. Physical Exam  Vitals and nursing note reviewed. Constitutional:       General: He is not in acute distress. Appearance: Normal appearance. He is not ill-appearing, toxic-appearing or diaphoretic. HENT:      Head: Normocephalic. Right Ear: Ear canal and external ear normal.      Left Ear: Ear canal and external ear normal.      Nose: Nose normal. No congestion or rhinorrhea. Mouth/Throat:      Mouth: Mucous membranes are moist.      Tongue: Lesions (White) present. Pharynx: Oropharynx is clear. No oropharyngeal exudate or posterior oropharyngeal erythema. Cardiovascular:      Rate and Rhythm: Normal rate. Pulses: Normal pulses. Pulmonary:      Effort: Pulmonary effort is normal. No respiratory distress. Breath sounds:  No stridor. No wheezing or rhonchi. Abdominal:      General: Abdomen is flat. Bowel sounds are normal.      Palpations: Abdomen is soft. Musculoskeletal:         General: No swelling or tenderness. Normal range of motion. Cervical back: Normal range of motion. Neurological:      General: No focal deficit present. Mental Status: He is alert and oriented to person, place, and time. Psychiatric:         Mood and Affect: Mood normal.         Behavior: Behavior normal.         DIAGNOSTIC RESULTS     Labs:  Results for orders placed or performed during the hospital encounter of 12/15/21   COVID-19, Rapid   Result Value Ref Range    SARS-CoV-2, [place] NOT  DETECTED NOT DETECTED   Rapid influenza A/B antigens   Result Value Ref Range    Flu A Antigen Negative NEGATIVE    Flu B Antigen Negative NEGATIVE       IMAGING:    No orders to display         EKG: None      URGENT CARE COURSE:     Vitals:    12/15/21 1551   BP: 130/81   Pulse: 86   Resp: 14   Temp: 97.8 °F (36.6 °C)   TempSrc: Temporal   SpO2: 97%   Weight: 165 lb (74.8 kg)   Height: 5' 10\" (1.778 m)       Medications - No data to display         PROCEDURES:  None    FINAL IMPRESSION      1. Nausea vomiting and diarrhea    2. Acute maxillary sinusitis, recurrence not specified    3. Thrush, oral    4. Chronic GERD          DISPOSITION/ PLAN     Patient seen and evaluated for the above symptoms. Symptoms consistent with likely acute maxillary sinusitis. He is provided a prescription for amoxicillin, Zyrtec, Flonase, and Bromfed-DM. He is provided a prescription for nystatin for oral thrush. He is provided a prescription for Zofran for nausea. He did discuss that he has chronic intermittent acid reflux, will provide a prescription for Prilosec. He is instructed to use over-the-counter Tylenol and Motrin for pain or fever. He is instructed to follow-up with his PCP in 3 to 5 days with new worsening symptoms.   He is agreeable with the above plan and

## 2021-12-15 NOTE — Clinical Note
Rustam Bernabe was seen and treated in our emergency department on 12/15/2021. He may return to work on 12/18/2021. May be off work one to two days if needed     If you have any questions or concerns, please don't hesitate to call.       Nidia Mullen, APRN - CNP

## 2021-12-17 ENCOUNTER — TELEPHONE (OUTPATIENT)
Dept: FAMILY MEDICINE CLINIC | Age: 36
End: 2021-12-17

## 2021-12-17 NOTE — LETTER
.. [address]  Phone: [phone]  Fax: [phone]    December 17, 2021    Vignesh Harley  [address] [place] [address]    Dear Tonie Wheat,    Thank you for choosing our [facility] on 12/15/21. Dr. Moisés Ku wanted to make sure that you understand your discharge instructions and that you were able to fill any prescriptions that may have been ordered for you. Please contact the office at the above phone number if advised you to follow up with Dr. Moisés Ku, or if you have any further questions or needs. Also, did you know -                             [facility]) practices can often offer you an appointment on the same day that you call for acute issues. *We have some [facility] offices that offer Walk-in appointments; check our website for availability in your community, [email or web address].      *Evisits are now available for patients through [address] If you do not have MyChart and are interested, please contact the office and a staff member may assist you or go to [email or web address].     Sincerely,     LILIAM Tristan CNP and your [facility]

## 2022-01-04 DIAGNOSIS — F32.A ANXIETY AND DEPRESSION: ICD-10-CM

## 2022-01-04 DIAGNOSIS — F41.9 ANXIETY AND DEPRESSION: ICD-10-CM

## 2022-01-04 DIAGNOSIS — R45.1 AGITATION: ICD-10-CM

## 2022-01-04 NOTE — TELEPHONE ENCOUNTER
Alex Ahn is requesting a refill on the following medications:  Requested Prescriptions     Pending Prescriptions Disp Refills    ARIPiprazole (ABILIFY) 15 MG tablet 30 tablet 0     Sig: Take 1 tablet by mouth daily    escitalopram (LEXAPRO) 10 MG tablet 30 tablet 0     Sig: Take 1 tablet by mouth daily       Date of last visit: 11/29/2021  Date of next visit (if applicable): 9/5/4949  Date of last refill: 11/29/2021  Pharmacy Name: [facility],  Nery Singh LPN

## 2022-01-04 NOTE — TELEPHONE ENCOUNTER
----- Message from [facility] sent at 1/4/2022  4:30 PM EST -----  Subject: Refill Request    QUESTIONS  Name of Medication? ARIPiprazole (ABILIFY) 15 MG tablet  Patient-reported dosage and instructions? Take 1 tablet by mouth daily   How many days do you have left? 0  Preferred Pharmacy? [facility] phone number (if available)? [phone]  Additional Information for Provider? Patient is out of the meds for 3   days, but has an appt. with Provider José Manuel Lopez on 01/06 at 11:20am, if he   could just get enough to the appt.   ---------------------------------------------------------------------------  --------------,  Name of Medication? escitalopram (LEXAPRO) 10 MG tablet  Patient-reported dosage and instructions? Take 1 by mouth daily   How many days do you have left? 0  Preferred Pharmacy? [facility] phone number (if available)? [phone]  Additional Information for Provider? Patient has been out of the   medication for 3 days, does have an appt with Provider José Manuel Lopez on 01/06   at 11:20am but needs enough med to get him to the appt. If this refill   request can be taken care of asap, sooner than later   ---------------------------------------------------------------------------  --------------  CALL BACK INFO  What is the best way for the office to contact you? OK to leave message on   voicemail  Preferred Call Back Phone Number?  [phone]

## 2022-01-05 RX ORDER — ESCITALOPRAM OXALATE 10 MG/1
10 TABLET ORAL DAILY
Qty: 30 TABLET | Refills: 0 | Status: SHIPPED | OUTPATIENT
Start: 2022-01-05 | End: 2022-02-08 | Stop reason: SDUPTHER

## 2022-01-05 RX ORDER — ARIPIPRAZOLE 15 MG/1
15 TABLET ORAL DAILY
Qty: 30 TABLET | Refills: 0 | Status: SHIPPED | OUTPATIENT
Start: 2022-01-05 | End: 2022-01-06 | Stop reason: DRUGHIGH

## 2022-01-06 ENCOUNTER — OFFICE VISIT (OUTPATIENT)
Dept: FAMILY MEDICINE CLINIC | Age: 37
End: 2022-01-06
Payer: COMMERCIAL

## 2022-01-06 VITALS
SYSTOLIC BLOOD PRESSURE: 102 MMHG | WEIGHT: 166.2 LBS | HEART RATE: 72 BPM | BODY MASS INDEX: 23.85 KG/M2 | DIASTOLIC BLOOD PRESSURE: 68 MMHG | RESPIRATION RATE: 12 BRPM

## 2022-01-06 DIAGNOSIS — F32.A ANXIETY AND DEPRESSION: Primary | ICD-10-CM

## 2022-01-06 DIAGNOSIS — K21.9 GASTROESOPHAGEAL REFLUX DISEASE WITHOUT ESOPHAGITIS: ICD-10-CM

## 2022-01-06 DIAGNOSIS — R45.1 AGITATION: ICD-10-CM

## 2022-01-06 DIAGNOSIS — F41.9 ANXIETY AND DEPRESSION: Primary | ICD-10-CM

## 2022-01-06 PROCEDURE — 99214 OFFICE O/P EST MOD 30 MIN: CPT | Performed by: NURSE PRACTITIONER

## 2022-01-06 RX ORDER — OMEPRAZOLE 40 MG/1
40 CAPSULE, DELAYED RELEASE ORAL DAILY
Qty: 30 CAPSULE | Refills: 1 | Status: SHIPPED | OUTPATIENT
Start: 2022-01-06 | End: 2022-03-07

## 2022-01-06 RX ORDER — ARIPIPRAZOLE 20 MG/1
20 TABLET ORAL DAILY
Qty: 30 TABLET | Refills: 3 | Status: SHIPPED | OUTPATIENT
Start: 2022-01-06 | End: 2022-03-22 | Stop reason: DRUGHIGH

## 2022-01-06 ASSESSMENT — ENCOUNTER SYMPTOMS
NAUSEA: 1
VOMITING: 1
ABDOMINAL PAIN: 0

## 2022-01-06 NOTE — PROGRESS NOTES
Larry Colon (:  1985) is a 29 y.o. male,Established patient, here for evaluation of the following chief complaint(s):  Medication Check (\"Medication Review\"  flu vaccine)         ASSESSMENT/PLAN:  1. Anxiety and depression  - Chronic, unstable  - Increase dose from 15 mg to 20 mg  - No adjustment to lexapro dose at this time  - Follow up in 1 month if symptoms not improving  -     ARIPiprazole (ABILIFY) 20 MG tablet; Take 1 tablet by mouth daily, Disp-30 tablet, R-3Normal    2. Agitation  - Chronic, unstable  - Increase dose from 15 mg to 20 mg  - No adjustment to lexapro dose at this time  - Follow up in 1 month if symptoms not improving  -     ARIPiprazole (ABILIFY) 20 MG tablet; Take 1 tablet by mouth daily, Disp-30 tablet, R-3Normal    3. Gastroesophageal reflux disease without esophagitis  - Start 40 mg omeprazole  - Educated on administration of medicatoin  - Consider pepcid if no improvement in symptoms  -  Eat smaller meals, more often. -  Limit foods and drinks that seem to make condition worse. These foods may include chocolate, spicy foods, and sodas that have caffeine. Other high-acid foods are oranges and tomatoes. -  Try to eat at least 2 to 3 hours before bedtime. This helps lower the amount of acid in the stomach when you lay down. -  Nonpharmacologic treatments encouraged, including: eating smaller meals, elevation of the head of bed at night, avoidance of caffeine, chocolate, nicotine and peppermint, and avoiding tight fitting clothing.  -     omeprazole (PRILOSEC) 40 MG delayed release capsule; Take 1 capsule by mouth daily, Disp-30 capsule, R-1Normal      Return in about 6 months (around 2022) for Depression, Anxiety. Subjective   SUBJECTIVE/OBJECTIVE:  1 month follow up for anxiety and depression. During last visit, abilify and lexapro were restarted. Feels he is doing well but feels he may need a dose increase. Symptoms worsened while off of the medication x4 days. and oriented to person, place, and time. Coordination: Coordination normal.   Psychiatric:         Mood and Affect: Mood is anxious. Behavior: Behavior normal.         Thought Content: Thought content normal.         Judgment: Judgment normal.                  An electronic signature was used to authenticate this note.     --Trang Guardado, LILIAM - CNP

## 2022-01-06 NOTE — PATIENT INSTRUCTIONS
Patient Education        Learning About Acid-Reducing Medicines  What are they? Acid-reducing medicines can help relieve heartburn and other symptoms of indigestion. They can help prevent damage to your digestive system from stomach acids. They also are used to treat reflux and ulcer symptoms. These medicines include H2 blockers and proton pump inhibitors (PPIs). They help your stomach make less acid. You can buy them over the counter. Some of them also come in prescription strengths. Antacids can also help relieve heartburn symptoms. They reduce the acid that is already in your stomach. You can buy them over the counter. Which medicine is best for you depends on what is causing your symptoms. How do they work? Acid-reducing medicines work in two ways. H2 blockers and proton pump inhibitors (PPIs) lower the amount of acid your stomach makes. They don't work on the acid that's already there. Antacids work by making stomach juices less acidic. But your heartburn may come back as your stomach makes more acid. What are some examples? Examples of acid reducers include:  H2 blockers. · Tagamet (cimetidine)  · Pepcid (famotidine)  Proton pump inhibitors (PPIs). · Nexium (esomeprazole)  · Prevacid (lansoprazole)  · Prilosec, Zegerid (omeprazole)  · Protonix (pantoprazole)  · Aciphex (rabeprazole)  Antacids. · Gaviscon  · Mylanta  · Maalox  · Tums  What are side effects might you have? Many people don't have side effects. And minor side effects might go away after a while. H2 blockers can cause headaches or make you dizzy. They might cause diarrhea or constipation. You may have nausea and vomiting. PPIs can cause headaches and diarrhea. Using them for a long time may raise your risk for infections or broken bones. Some antacids can cause constipation or diarrhea. The brands vary in the ingredients they use. They can have different side effects.   If you use too much heartburn medicine, your body may not get enough of some minerals from your food. How can you take these medicines safely? Some H2 blockers and PPIs can affect how other medicines work. Tell your doctor if you use other medicines. He or she may change the dose or give you a different medicine. Many antacids have aspirin in them. Read the label to make sure that you don't take too much. Too much aspirin can be harmful. Be safe with medicines. Take your medicines exactly as prescribed. If you take over-the-counter medicine, be sure to read and follow all instructions on the label. Call your doctor if you think you are having a problem with your medicine. Check with your doctor or pharmacist before you use any other medicines. This includes over-the-counter medicines. Tell your doctor about all of the medicines, vitamins, herbal products, and supplements you take. Taking some medicines together can cause problems. Follow-up care is a key part of your treatment and safety. Be sure to make and go to all appointments, and call your doctor if you are having problems. It's also a good idea to know your test results and keep a list of the medicines you take. Where can you learn more? Go to https://GlycoPurepepiceweb.BoxVentures. org and sign in to your [employer] account. Enter [phone] in the [facility] box to learn more about \"Learning About Acid-Reducing Medicines. \"     If you do not have an account, please click on the \"Sign Up Now\" link. Current as of: September 8, 2021               Content Version: 13.1  © 2006-2021 Healthwise, Incorporated. Care instructions adapted under license by [facility] ([facility]). If you have questions about a medical condition or this instruction, always ask your healthcare professional. [place] [address] any warranty or liability for your use of this information. Patient Education        Anxiety Disorder: Care Instructions  Your Care Instructions     Anxiety is a normal reaction to stress. Difficult situations can cause you to have symptoms such as sweaty palms and a nervous feeling. In an anxiety disorder, the symptoms are far more severe. Constant worry, muscle tension, trouble sleeping, nausea and diarrhea, and other symptoms can make normal daily activities difficult or impossible. These symptoms may occur for no reason, and they can affect your work, school, or social life. Medicines, counseling, and self-care can all help. Follow-up care is a key part of your treatment and safety. Be sure to make and go to all appointments, and call your doctor if you are having problems. It's also a good idea to know your test results and keep a list of the medicines you take. How can you care for yourself at home? · Take medicines exactly as directed. Call your doctor if you think you are having a problem with your medicine. · Go to your counseling sessions and follow-up appointments. · Recognize and accept your anxiety. Then, when you are in a situation that makes you anxious, say to yourself, \"This is not an emergency. I feel uncomfortable, but I am not in danger. I can keep going even if I feel anxious. \"  · Be kind to your body:  ? Relieve tension with exercise or a massage. ? Get enough rest.  ? Avoid alcohol, caffeine, nicotine, and illegal drugs. They can increase your anxiety level and cause sleep problems. ? Learn and do relaxation techniques. See below for more about these techniques. · Engage your mind. Get out and do something you enjoy. Go to a funny movie, or take a walk or hike. Plan your day. Having too much or too little to do can make you anxious. · Keep a record of your symptoms. Discuss your fears with a good friend or family member, or join a support group for people with similar problems. Talking to others sometimes relieves stress. · Get involved in social groups, or volunteer to help others. Being alone sometimes makes things seem worse than they are.   · Get at least 27 minutes of exercise on most days of the week to relieve stress. Walking is a good choice. You also may want to do other activities, such as running, swimming, cycling, or playing tennis or team sports. Relaxation techniques  Do relaxation exercises 10 to 20 minutes a day. You can play soothing, relaxing music while you do them, if you wish. · Tell others in your house that you are going to do your relaxation exercises. Ask them not to disturb you. · Find a comfortable place, away from all distractions and noise. · Lie down on your back, or sit with your back straight. · Focus on your breathing. Make it slow and steady. · Breathe in through your nose. Breathe out through either your nose or mouth. · Breathe deeply, filling up the area between your navel and your rib cage. Breathe so that your belly goes up and down. · Do not hold your breath. · Breathe like this for 5 to 10 minutes. Notice the feeling of calmness throughout your whole body. As you continue to breathe slowly and deeply, relax by doing the following for another 5 to 10 minutes:  · Tighten and relax each muscle group in your body. You can begin at your toes and work your way up to your head. · Imagine your muscle groups relaxing and becoming heavy. · Empty your mind of all thoughts. · Let yourself relax more and more deeply. · Become aware of the state of calmness that surrounds you. · When your relaxation time is over, you can bring yourself back to alertness by moving your fingers and toes and then your hands and feet and then stretching and moving your entire body. Sometimes people fall asleep during relaxation, but they usually wake up shortly afterward. · Always give yourself time to return to full alertness before you drive a car or do anything that might cause an accident if you are not fully alert. Never play a relaxation tape while you drive a car. When should you call for help?    Call 911 anytime you think you may need emergency care. For example, call if:    · You feel you cannot stop from hurting yourself or someone else. Keep the numbers for these national suicide hotlines: 2-716-181-TALK (4-636.518.5540) and 0-697-PYFFYQS (2-183.818.9955). If you or someone you know talks about suicide or feeling hopeless, get help right away. Watch closely for changes in your health, and be sure to contact your doctor if:    · You have anxiety or fear that affects your life.     · You have symptoms of anxiety that are new or different from those you had before. Where can you learn more? Go to https://Zymeworks.AxoGen. org and sign in to your [employer] account. Enter P754 in the [employer] box to learn more about \"Anxiety Disorder: Care Instructions. \"     If you do not have an account, please click on the \"Sign Up Now\" link. Current as of: September 23, 2020               Content Version: 12.9  © 2006-2021 [employer]. Care instructions adapted under license by [facility] ([facility]). If you have questions about a medical condition or this instruction, always ask your healthcare professional. [place] [address] any warranty or liability for your use of this information. Patient Education        Gastroesophageal Reflux Disease (GERD): Care Instructions  Overview     Gastroesophageal reflux disease (GERD) is the backward flow of stomach acid into the esophagus. The esophagus is the tube that leads from your throat to your stomach. A one-way valve prevents the stomach acid from backing up into this tube. But when you have GERD, this valve does not close tightly enough. This can also cause pain and swelling in your esophagus. (This is called esophagitis.)  If you have mild GERD symptoms including heartburn, you may be able to control the problem with antacids or over-the-counter medicine. You can also make lifestyle changes to help reduce your symptoms.  These include changing your diet and eating habits, such as not eating late at night and losing weight. Follow-up care is a key part of your treatment and safety. Be sure to make and go to all appointments, and call your doctor if you are having problems. It's also a good idea to know your test results and keep a list of the medicines you take. How can you care for yourself at home? · Take your medicines exactly as prescribed. Call your doctor if you think you are having a problem with your medicine. · Your doctor may recommend over-the-counter medicine. For mild or occasional indigestion, antacids, such as Tums, Gaviscon, Mylanta, or Maalox, may help. Your doctor also may recommend over-the-counter acid reducers, such as famotidine (Pepcid AC), cimetidine (Tagamet HB), or omeprazole (Prilosec). Read and follow all instructions on the label. If you use these medicines often, talk with your doctor. · Change your eating habits. ? It's best to eat several small meals instead of two or three large meals. ? After you eat, wait 2 to 3 hours before you lie down. ? Avoid foods that make your symptoms worse. These may include chocolate, mint, alcohol, pepper, spicy foods, high-fat foods, or drinks with caffeine in them, such as tea, coffee, vikki, or energy drinks. If your symptoms are worse after you eat a certain food, you may want to stop eating it to see if your symptoms get better. · Do not smoke or chew tobacco. Smoking can make GERD worse. If you need help quitting, talk to your doctor about stop-smoking programs and medicines. These can increase your chances of quitting for good. · If you have GERD symptoms at night, raise the head of your bed 6 to 8 inches by putting the frame on blocks or placing a foam wedge under the head of your mattress. (Adding extra pillows does not work.)  · Do not wear tight clothing around your middle. · Lose weight if you need to. Losing just 5 to 10 pounds can help. When should you call for help?    Call your doctor now or seek immediate medical care if:    · You have new or different belly pain.     · Your stools are black and tarlike or have streaks of blood. Watch closely for changes in your health, and be sure to contact your doctor if:    · Your symptoms have not improved after 2 days.     · Food seems to catch in your throat or chest.   Where can you learn more? Go to https://Storm Tactical ProductspeTamar Energy.Cerac. org and sign in to your [employer] account. Enter S119 in the [employer] box to learn more about \"Gastroesophageal Reflux Disease (GERD): Care Instructions. \"     If you do not have an account, please click on the \"Sign Up Now\" link. Current as of: September 8, 2021               Content Version: 13.1  © 8250-1755 Healthwise, Incorporated. Care instructions adapted under license by [facility] ([facility]). If you have questions about a medical condition or this instruction, always ask your healthcare professional. [place] [address] any warranty or liability for your use of this information.

## 2022-03-07 ENCOUNTER — TELEPHONE (OUTPATIENT)
Dept: FAMILY MEDICINE CLINIC | Age: 37
End: 2022-03-07

## 2022-03-07 DIAGNOSIS — F41.9 ANXIETY AND DEPRESSION: ICD-10-CM

## 2022-03-07 DIAGNOSIS — F32.A ANXIETY AND DEPRESSION: ICD-10-CM

## 2022-03-07 RX ORDER — ESCITALOPRAM OXALATE 20 MG/1
20 TABLET ORAL DAILY
Qty: 30 TABLET | Refills: 3 | Status: SHIPPED | OUTPATIENT
Start: 2022-03-07 | End: 2022-03-22

## 2022-03-07 NOTE — TELEPHONE ENCOUNTER
----- Message from Darlene Holt sent at 3/7/2022 12:19 PM EST -----  Subject: Refill Request    QUESTIONS  Name of Medication? escitalopram (LEXAPRO) 20 MG tablet  Patient-reported dosage and instructions? 1 tablet daily  How many days do you have left? 4  Preferred Pharmacy? [address] phone number (if available)? [phone]  Additional Information for Provider? running out   ---------------------------------------------------------------------------  --------------  [address]  What is the best way for the office to contact you? OK to leave message on   voicemail  Preferred Call Back Phone Number?  [phone]

## 2022-03-22 ENCOUNTER — OFFICE VISIT (OUTPATIENT)
Dept: FAMILY MEDICINE CLINIC | Age: 37
End: 2022-03-22
Payer: COMMERCIAL

## 2022-03-22 VITALS
DIASTOLIC BLOOD PRESSURE: 78 MMHG | SYSTOLIC BLOOD PRESSURE: 122 MMHG | BODY MASS INDEX: 23.39 KG/M2 | WEIGHT: 163 LBS | RESPIRATION RATE: 16 BRPM | HEART RATE: 64 BPM

## 2022-03-22 DIAGNOSIS — R45.4 IRRITABILITY: Primary | ICD-10-CM

## 2022-03-22 DIAGNOSIS — F41.9 ANXIETY AND DEPRESSION: ICD-10-CM

## 2022-03-22 DIAGNOSIS — F32.A ANXIETY AND DEPRESSION: ICD-10-CM

## 2022-03-22 PROCEDURE — 99214 OFFICE O/P EST MOD 30 MIN: CPT | Performed by: NURSE PRACTITIONER

## 2022-03-22 RX ORDER — ARIPIPRAZOLE 30 MG/1
30 TABLET ORAL DAILY
Qty: 30 TABLET | Refills: 3 | Status: SHIPPED | OUTPATIENT
Start: 2022-03-22 | End: 2022-04-20 | Stop reason: SDUPTHER

## 2022-03-22 RX ORDER — FLUOXETINE 10 MG/1
10 TABLET, FILM COATED ORAL DAILY
Qty: 30 TABLET | Refills: 0 | Status: SHIPPED | OUTPATIENT
Start: 2022-03-22 | End: 2022-04-20 | Stop reason: SDUPTHER

## 2022-03-22 ASSESSMENT — PATIENT HEALTH QUESTIONNAIRE - PHQ9
SUM OF ALL RESPONSES TO PHQ QUESTIONS 1-9: 13
1. LITTLE INTEREST OR PLEASURE IN DOING THINGS: 2
8. MOVING OR SPEAKING SO SLOWLY THAT OTHER PEOPLE COULD HAVE NOTICED. OR THE OPPOSITE, BEING SO FIGETY OR RESTLESS THAT YOU HAVE BEEN MOVING AROUND A LOT MORE THAN USUAL: 2
3. TROUBLE FALLING OR STAYING ASLEEP: 2
9. THOUGHTS THAT YOU WOULD BE BETTER OFF DEAD, OR OF HURTING YOURSELF: 0
SUM OF ALL RESPONSES TO PHQ9 QUESTIONS 1 & 2: 4
2. FEELING DOWN, DEPRESSED OR HOPELESS: 2
10. IF YOU CHECKED OFF ANY PROBLEMS, HOW DIFFICULT HAVE THESE PROBLEMS MADE IT FOR YOU TO DO YOUR WORK, TAKE CARE OF THINGS AT HOME, OR GET ALONG WITH OTHER PEOPLE: 2
5. POOR APPETITE OR OVEREATING: 2
SUM OF ALL RESPONSES TO PHQ QUESTIONS 1-9: 13
4. FEELING TIRED OR HAVING LITTLE ENERGY: 2
SUM OF ALL RESPONSES TO PHQ QUESTIONS 1-9: 13
6. FEELING BAD ABOUT YOURSELF - OR THAT YOU ARE A FAILURE OR HAVE LET YOURSELF OR YOUR FAMILY DOWN: 1
7. TROUBLE CONCENTRATING ON THINGS, SUCH AS READING THE NEWSPAPER OR WATCHING TELEVISION: 0
SUM OF ALL RESPONSES TO PHQ QUESTIONS 1-9: 13

## 2022-03-22 ASSESSMENT — ANXIETY QUESTIONNAIRES
2. NOT BEING ABLE TO STOP OR CONTROL WORRYING: 3
1. FEELING NERVOUS, ANXIOUS, OR ON EDGE: 3
GAD7 TOTAL SCORE: 19
IF YOU CHECKED OFF ANY PROBLEMS ON THIS QUESTIONNAIRE, HOW DIFFICULT HAVE THESE PROBLEMS MADE IT FOR YOU TO DO YOUR WORK, TAKE CARE OF THINGS AT HOME, OR GET ALONG WITH OTHER PEOPLE: EXTREMELY DIFFICULT
6. BECOMING EASILY ANNOYED OR IRRITABLE: 3
7. FEELING AFRAID AS IF SOMETHING AWFUL MIGHT HAPPEN: 1
3. WORRYING TOO MUCH ABOUT DIFFERENT THINGS: 3
4. TROUBLE RELAXING: 3
5. BEING SO RESTLESS THAT IT IS HARD TO SIT STILL: 3

## 2022-03-22 NOTE — PROGRESS NOTES
Bertrand Clinton (:  1985) is a 29 y.o. male,Established patient, here for evaluation of the following chief complaint(s):  Follow-up (anx and depression-doesn't feel like medication is working, kaba, irritable )         ASSESSMENT/PLAN:  1. Irritability  - Chronic, unstable  - Increase abilify to 30 mg, from 20 mg  - Stop lexapro, start prozac   - Referral placed to psychiatry for further evaluation and treatment  - Strongly encouraged patientto begin counseling services  -     [facility]  -     ARIPiprazole (ABILIFY) 30 MG tablet; Take 1 tablet by mouth daily, Disp-30 tablet, R-3Normal  -     FLUoxetine (PROZAC) 10 MG tablet; Take 1 tablet by mouth daily, Disp-30 tablet, R-0Normal    2. Anxiety and depression  - Chronic, unstable  - Increase abilify to 30 mg, from 20 mg  - Stop lexapro, start prozac   - Referral placed to psychiatry for further evaluation and treatment  - Strongly encouraged patientto begin counseling services  -     [facility]  -     ARIPiprazole (ABILIFY) 30 MG tablet; Take 1 tablet by mouth daily, Disp-30 tablet, R-3Normal  -     FLUoxetine (PROZAC) 10 MG tablet; Take 1 tablet by mouth daily, Disp-30 tablet, R-0Normal      Return in about 1 month (around 2022) for Depression, Anxiety. Subjective   SUBJECTIVE/OBJECTIVE:  Irritability, anxiety/depression follow up. Abilify dose was increased during last visit. Feels the abilify increase was temporarily helpful but no longer feels his medications are managing symptoms. Feels agitated. Reports mood swings. Short-tempered. Agitation mostly directed at girlfriend and mother. Also affecting his work. Denies presence of violent behavior. Relationships and work performance has suffered d/t irritability. Also reports symptoms of ED during periods of increased agitation/anxiety. Using sildenafil prn.     PHQ-9 Total Score: 13 (3/22/2022  8:46 AM)  Thoughts that you would be better off dead, or of hurting yourself in some way: 0 (3/22/2022  8:46 AM)        Review of Systems   Psychiatric/Behavioral: Positive for agitation and dysphoric mood. Negative for self-injury and suicidal ideas. The patient is nervous/anxious. Objective   Physical Exam  Vitals and nursing note reviewed. Constitutional:       Appearance: He is well-developed. HENT:      Head: Normocephalic and atraumatic. Right Ear: Hearing, tympanic membrane, ear canal and external ear normal.      Left Ear: Hearing, tympanic membrane, ear canal and external ear normal.      Nose:      Right Sinus: No maxillary sinus tenderness or frontal sinus tenderness. Left Sinus: No maxillary sinus tenderness or frontal sinus tenderness. Eyes:      General:         Right eye: No discharge. Left eye: No discharge. Conjunctiva/sclera: Conjunctivae normal.      Pupils: Pupils are equal, round, and reactive to light. Neck:      Vascular: No JVD. Cardiovascular:      Rate and Rhythm: Normal rate and regular rhythm. Heart sounds: Normal heart sounds. No murmur heard. Pulmonary:      Effort: Pulmonary effort is normal. No tachypnea. Breath sounds: Normal breath sounds. No stridor. No wheezing. Abdominal:      General: There is no distension. Tenderness: There is no abdominal tenderness. Musculoskeletal:         General: No deformity. Cervical back: Normal range of motion. Comments: ROM in all extremities WNL. No deformities. Lymphadenopathy:      Head:      Right side of head: No submental or submandibular adenopathy. Left side of head: No submental or submandibular adenopathy. Skin:     General: Skin is warm and dry. Capillary Refill: Capillary refill takes less than 2 seconds. Findings: No rash. Neurological:      Mental Status: He is alert and oriented to person, place, and time.       Coordination: Coordination normal.   Psychiatric:         Mood and Affect: Mood is anxious and depressed. Affect is not flat, angry or inappropriate. Behavior: Behavior normal. Behavior is not agitated or aggressive. Thought Content: Thought content normal.         Judgment: Judgment normal.                  An electronic signature was used to authenticate this note.     --LILIAM Reynolds - CNP

## 2022-03-22 NOTE — PATIENT INSTRUCTIONS
Patient Education        Learning About Managing Anger  What causes anger? Many things can cause anger: Stress at work or at home. Social situations that make you angry. A response to everyday events. Anger signals your body to prepare for a fight. This reaction is often called \"fight or flight. \" When you get angry, adrenaline and other hormones are released into your blood. Then your blood pressure goes up, your heart beats faster, and you breathe faster. When you express anger in a healthy way, it can inspire change and make you productive. But if you don't have the skills to express anger in a healthy way, anger can build up. You may hurt others--and yourself--emotionally and even physically. Violent behavior often starts with verbal threats or fairly minor incidents. But over time, it can involve physical harm. It can include physical, verbal, or sexual abuse of an intimate partner (domestic violence), a child (child abuse), or an older adult (elder abuse). It can also make you sick. Anger and constant hostility keep your blood pressure high. They increase your chances of having another health problem, such as depression, a heart attack, or a stroke. Some people with post-traumatic stress disorder (PTSD) feel angry and on alert all the time. It may feel like there are no other ways to react when you are angry. But when you learn to work with anger in appropriate and healthy ways, your anger no longer controls you. How can you manage your anger? The first step to managing anger is to be more aware of it. Note the thoughts, feelings, and emotions that you have when you get angry. Practice noticing these signs of anger when you are calm. If you are more aware of the signs of anger, you can take steps to manage it. Here are a few tips:  · Think before you act. Take time to stop and cool down when you feel yourself getting angry. Count to 10 while you take slow, steady breaths.  Practice some other form of mental relaxation. · Learn the feelings that lead to angry outbursts. Anger and hostility may be a symptom of unhappy feelings or depression about your job, your relationship, or other aspects of your personal life. · Avoid situations that trigger your anger. If standing in line bothers you, do errands at less busy times. · Express anger in a healthy way. You might:  ? Go for a short walk or jog.  ? Draw, paint, or listen to music to release the anger. ? Write in a daily journal.  ? Use \"I\" statements, not \"you\" statements, to discuss your anger. Say \"I don't feel valued when my needs are not being met\" instead of \"You make me mad when you are so inconsiderate. \"  · Take care of yourself. ? Exercise regularly. ? Eat a balanced diet. Don't skip meals. ? Try to get 8 hours of sleep each night. ? Limit your use of alcohol, and don't use illegal drugs. ? Practice yoga, meditation, or david chi to relax. · Explore other resources that may be available through your job or your community. ? Contact your human resources department at work. You might be able to get services through an employee assistance program.  ? Contact your local hospital, mental health facility, or health department. Ask what types of programs or support groups are available in your area. · Do not keep guns in your home. If you must have guns in your home, unload them and lock them up. Lock ammunition in a separate place. Keep guns away from children. Where can you find help? If anger or stress starts to harm your work or personal relationships, you might seek help. You can learn ways to control your feelings and actions. · Talk to someone you trust, or find a counselor. · There are groups in your area that can connect you with people to talk to. ? [address] . This service from the [facility] Substance Abuse and [address] can help you find local counselors.  Search online at findtreatment. [facility].gov or call 6-321-947-HELP (865 289 617), or TDD 3-706.765.3510.  ? Parents Anonymous. Self-help groups that serve parents under stress, as well as children who have been abused, are available throughout the [facility], [place] ([facility]), and [facility]. To find a group in your area, search online or in your phone book under Parents Anonymous or call (580) 188-2295. Where can you learn more? Go to https://HelpHubpeNetSpendeb.StrataCloud. org and sign in to your Satya Inti Dharma account. Enter I597 in the Awareness Card box to learn more about \"Learning About Managing Anger. \"     If you do not have an account, please click on the \"Sign Up Now\" link. Current as of: June 16, 2021               Content Version: 13.1  © 1189-7126 RapidMiner. Care instructions adapted under license by [facility] ([facility]). If you have questions about a medical condition or this instruction, always ask your healthcare professional. [place] [address] any warranty or liability for your use of this information. Patient Education        Generalized Anxiety Disorder: Care Instructions  Overview     We all worry. It's a normal part of life. But when you have generalized anxiety disorder, you worry about lots of things. You have a hard time not worrying. This worry or anxiety interferes with your relationships, work or school, and other areas of your life. You may worry most days about things like money, health, work, or friends. That may make you feel tired, tense, or cranky. It can make it hard to think. It may get in the way of healthy sleep. Counseling and medicine can both work to treat anxiety. They are often used together with lifestyle changes, such as getting enough sleep. Treatment can include a type of counseling called cognitive-behavioral therapy, or CBT. It helps you notice and replace thoughts that make you worry.  You also might have counseling along with those closest to you so that they can help.  Follow-up care is a key part of your treatment and safety. Be sure to make and go to all appointments, and call your doctor if you are having problems. It's also a good idea to know your test results and keep a list of the medicines you take. How can you care for yourself at home? · Get at least 30 minutes of exercise on most days of the week. Walking is a good choice. You also may want to do other activities, such as running, swimming, cycling, or playing tennis or team sports. · Learn and do relaxation exercises, such as deep breathing. · Go to bed at the same time every night. Try for 8 to 10 hours of sleep a night. · Avoid alcohol, marijuana, and illegal drugs. · Find a counselor who uses cognitive-behavioral therapy (CBT). · Don't isolate yourself. Let those closest to you help you. Find someone you can trust and confide in. Talk to that person. · Be safe with medicines. Take your medicines exactly as prescribed. Call your doctor if you think you are having a problem with your medicine. · Practice healthy thinking. How you think can affect how you feel and act. Ask yourself if your thoughts are helpful or unhelpful. If they are unhelpful, you can learn how to change them. · Recognize and accept your anxiety. When you feel anxious, say to yourself, \"This is not an emergency. I feel uncomfortable, but I am not in danger. I can keep going even if I feel anxious. \"  When should you call for help? Call 911  anytime you think you may need emergency care. For example, call if:    · You feel you can't stop from hurting yourself or someone else. Keep the numbers for these national suicide hotlines: 8-480-778-TALK (0-468.298.4568) and 7-499-TNQZZGS (2-528.179.6022). If you or someone you know talks about suicide or feeling hopeless, get help right away.    Call your doctor or counselor now or seek immediate medical care if:    · You have new anxiety, or your anxiety gets worse.     · You have been feeling sad, depressed, or hopeless or have lost interest in things that you usually enjoy.     · You do not get better as expected. Where can you learn more? Go to https://GlassUppepicewTekBrix IT Solutions.Verastem. org and sign in to your userfox account. Enter G123 in the [employer] box to learn more about \"Generalized Anxiety Disorder: Care Instructions. \"     If you do not have an account, please click on the \"Sign Up Now\" link. Current as of: June 16, 2021               Content Version: 13.1  © 0331-3535 Diino Systems. Care instructions adapted under license by [facility] ([facility]). If you have questions about a medical condition or this instruction, always ask your healthcare professional. [address] any warranty or liability for your use of this information. Patient Education        Depression Treatment: Care Instructions  Your Care Instructions     Depression is a condition that affects the way you feel, think, and act. It causes symptoms such as low energy, loss of interest in daily activities, and sadness or grouchiness that goes on for a long time. Depression is very common and affects men and women of all ages. Depression is a medical illness caused by changes in the natural chemicals in your brain. It is not a character flaw, and it does not mean that you are a bad or weak person. It does not mean that you are going crazy. It is important to know that depression can be treated. Medicines, counseling, and self-care can all help. Many people do not get help because they are embarrassed or think that they will get over the depression on their own. But some people do not get better without treatment. Follow-up care is a key part of your treatment and safety. Be sure to make and go to all appointments, and call your doctor if you are having problems. It's also a good idea to know your test results and keep a list of the medicines you take.   How can you care for yourself at home? Learn about antidepressant medicines  Antidepressant medicines can improve or end the symptoms of depression. You may need to take the medicine for at least 6 months, and often longer. Keep taking your medicine even if you feel better. If you stop taking it too soon, your symptoms may come back or get worse. You may start to feel better within 1 to 3 weeks of taking antidepressant medicine. But it can take as many as 6 to 8 weeks to see more improvement. Talk to your doctor if you have problems with your medicine or if you do not notice any improvement after 3 weeks. Antidepressants can make you feel tired, dizzy, or nervous. Some people have dry mouth, constipation, headaches, sexual problems, an upset stomach, or diarrhea. Many of these side effects are mild and go away on their own after you take the medicine for a few weeks. Some may last longer. Talk to your doctor if side effects bother you too much. You might be able to try a different medicine. If you are pregnant or breastfeeding, talk to your doctor about what medicines you can take. Learn about counseling  In many cases, counseling can work as well as medicines to treat mild to moderate depression. Counseling is done by licensed mental health providers, such as psychologists, social workers, and some types of nurses. It can be done in one-on-one sessions or in a group setting. Many people find group sessions helpful. Cognitive-behavioral therapy is a type of counseling. In this treatment, you learn how to see and change unhelpful thinking styles that may be adding to your depression. Counseling and medicines often work well when used together. When should you call for help? Call 911 anytime you think you may need emergency care. For example, call if:    · You feel you cannot stop from hurting yourself or someone else.    Call your doctor now or seek immediate medical care if:    · You hear voices.     · You feel much more depressed. Watch closely for changes in your health, and be sure to contact your doctor if:    · You are having problems with your depression medicine.     · You are not getting better as expected. Where can you learn more? Go to https://bhargav.anywayanyday. org and sign in to your Every1Mobile account. Enter J522 in the SweetSlap box to learn more about \"Depression Treatment: Care Instructions. \"     If you do not have an account, please click on the \"Sign Up Now\" link. Current as of: June 16, 2021               Content Version: 13.1  © 4436-5487 Kaola100. Care instructions adapted under license by [facility] ([facility]). If you have questions about a medical condition or this instruction, always ask your healthcare professional. [place] [address] any warranty or liability for your use of this information. Patient Education        Bipolar Disorder: Care Instructions  Your Care Instructions     Bipolar disorder is an illness that causes extreme mood changes, from times of very high energy (manic episodes) to times of depression. But many people with bipolar disorder show only the symptoms of depression. These moods may cause problems with your work, school, family life, friendships, and how well you function. This disease is also called manic-depression. There is no cure for bipolar disorder, but it can be helped with medicines. Counseling may also help. It is important to take your medicines exactly as prescribed, even when you feel well. You may need lifelong treatment. Follow-up care is a key part of your treatment and safety. Be sure to make and go to all appointments, and call your doctor if you are having problems. It's also a good idea to know your test results and keep a list of the medicines you take. How can you care for yourself at home? · Be safe with medicines. Take your medicines exactly as prescribed.  Do not stop or change a medicine without talking to your doctor first. Ian Underwood and your doctor may need to try different combinations of medicines to find what works for you. · Take your medicines on schedule to keep your moods even. When you feel good, you may think that you do not need your medicines. But it is important to keep taking them. · Go to your counseling sessions. Call and talk with your counselor if you can't go to a session or if you don't think the sessions are helping. Do not just stop going. · Get at least 30 minutes of activity on most days of the week. Walking is a good choice. You also may want to do other things, such as running, swimming, or cycling. · Get enough sleep. Keep your room dark and quiet. Try to go to bed at the same time every night. · Eat a healthy diet. This includes whole grains, dairy, fruits, vegetables, and protein. Eat foods from each of these groups. · Try to lower your stress. Manage your time, build a strong support system, and lead a healthy lifestyle. To lower your stress, try physical activity, slow deep breathing, or getting a massage. · Do not use alcohol, marijuana, or illegal drugs. · Learn the early signs of your mood changes. You can then take steps to help yourself feel better. · Ask for help from friends and family when you need it. You may need help with daily chores when you are depressed. When you are manic, you may need support to control your high energy levels. What should you do if someone in your family has bipolar disorder? · Learn about the disease and signs it's getting worse. · Remind your family member you love them. · Make a plan with all family members about how to take care of your loved one when symptoms are bad. · Remind yourself it will take time for changes to occur. · Try not to blame yourself for the disease. · Know your legal rights and the legal rights of your family member. Support groups or counselors can help with this information. · Take care of yourself.  Keep up with your interests, such as career, hobbies, and friends. Use exercise, positive self-talk, deep breathing, and other relaxing exercises to help lower your stress. · Give yourself time to grieve. You may need to deal with emotions such as anger, fear, and frustration. · If you are having a hard time with your feelings or with your relationship with your family member, talk with a counselor. When should you call for help? Call 911 anytime you think you may need emergency care. For example, call if:    · You feel like hurting yourself or someone else.     · Someone who has bipolar disorder displays dangerous behavior, and you think the person might hurt himself or herself or someone else. Call your doctor now or seek immediate medical care if:    · You hear voices.     · Someone you know has bipolar disorder and talks about suicide. Keep the numbers for these national suicide hotlines: 0-005-364-TALK (2-528.582.1934) and 4-217-QWKGZIO (1-970.689.8846). If a suicide threat seems real, with a specific plan and a way to carry it out, stay with the person, or ask someone you trust to stay with the person, until you can get help.     · Someone you know has bipolar disorder and:  ? Starts to give away possessions. ? Is using illegal drugs or drinking alcohol heavily. ? Talks or writes about death, including writing suicide notes or talking about guns, knives, or pills. ? Talks or writes about hurting someone else. ? Starts to spend a lot of time alone. ? Acts very aggressively or suddenly appears calm. ? Talks about beliefs that are not based in reality (delusions). Watch closely for changes in your health, and be sure to contact your doctor if:    · You cannot go to your counseling sessions. Where can you learn more? Go to https://bhargav.Talasim. org and sign in to your Factual account.  Enter K052 in the moneymeets box to learn more about \"Bipolar Disorder: Care Instructions. \"     If you do not have an account, please click on the \"Sign Up Now\" link. Current as of: June 16, 2021               Content Version: 13.1  © 2006-2021 Healthwise, Incorporated. Care instructions adapted under license by [facility] ([facility]). If you have questions about a medical condition or this instruction, always ask your healthcare professional. [address] any warranty or liability for your use of this information. Patient Education        Learning About Mood Disorders  What are mood disorders? Mood disorders are medical problems that affect how you feel. They can impact your moods, thoughts, and actions. Mood disorders include:  · Depression. This causes you to feel sad or hopeless for much of the time. · Bipolar disorder. This causes extreme mood changes from manic episodes of very high energy to extreme lows of depression. · Seasonal affective disorder (SAD). This is a type of depression that affects you during the same season each year. Most often people experience SAD during the fall and winter months when days are shorter and there is less light. What are the symptoms? Depression  You may:  · Feel sad or hopeless nearly every day. · Lose interest in or not get pleasure from most daily activities. You feel this way nearly every day. · Have low energy, changes in your appetite, or changes in how well you sleep. · Have trouble concentrating. · Think about death and suicide. Keep the numbers for these national suicide hotlines: 1-127-112-TALK (9-779.983.4104) and 5-368-UHIQSZE (0-605.505.2820). If you or someone you know talks about suicide or feeling hopeless, get help right away. Bipolar disorder  Symptoms depend on your mood swings. You may:  · Feel very happy, energetic, or on edge. · Feel like you need very little sleep. · Feel overly self-confident. · Do impulsive things, such as spending a lot of money. · Feel sad or hopeless.   · Have racing thoughts or trouble thinking and making decisions. · Lose interest in things you have enjoyed in the past.  · Think about death and suicide. Keep the numbers for these national suicide hotlines: 5-572-018-TALK (6-873.220.7249) and 9-903-CWTVKKO (6-148.578.7880). If you or someone you know talks about suicide or feeling hopeless, get help right away. Seasonal affective disorder (SAD)  Symptoms come and go at about the same time each year. For most people with SAD, symptoms come during the winter when there is less daylight. You may:  · Feel sad, grumpy, kaba, or anxious. · Lose interest in your usual activities. · Eat more and crave carbohydrates, such as bread and pasta. · Gain weight. · Sleep more and feel drowsy during the daytime. How are mood disorders treated? Mood disorders can be treated with medicines or counseling, or a combination of both. Medicines for depression and SAD may include antidepressants. Medicines for bipolar disorder may include:  · Mood stabilizers. · Antipsychotics. · Benzodiazepines. Counseling may involve cognitive-behavioral therapy. It teaches you how to change the ways you think and behave. This can help you stop thinking bad thoughts about yourself and your life. Light therapy is the main treatment for SAD. This therapy uses a special kind of lamp. You let the lamp shine on you at certain times, usually in the morning. This may help your symptoms during the months when there is less sunlight. Healthy lifestyle  Healthy lifestyle changes may help you feel better. · Be active often. You might try walking or strength training. · Eat a healthy diet. Include fruits, vegetables, lean proteins, and whole grains in your diet each day. · Keep a regular sleep schedule. Try for 8 hours of sleep a night. · Find ways to manage stress, such as relaxation exercises. · Avoid alcohol and illegal drugs. Follow-up care is a key part of your treatment and safety.  Be sure to make and go to all appointments, and call your doctor if you are having problems. It's also a good idea to know your test results and keep a list of the medicines you take. Where can you learn more? Go to https://BioTheryXsteffanieeb.VitalMedix. org and sign in to your MYFX account. Enter Q739 in the Action Auto Sales box to learn more about \"Learning About Mood Disorders. \"     If you do not have an account, please click on the \"Sign Up Now\" link. Current as of: June 16, 2021               Content Version: 13.1  © 6046-8676 Healthwise, Incorporated. Care instructions adapted under license by [facility] ([facility]). If you have questions about a medical condition or this instruction, always ask your healthcare professional. [address] any warranty or liability for your use of this information.

## 2022-04-18 DIAGNOSIS — F32.A ANXIETY AND DEPRESSION: ICD-10-CM

## 2022-04-18 DIAGNOSIS — R45.4 IRRITABILITY: ICD-10-CM

## 2022-04-18 DIAGNOSIS — F41.9 ANXIETY AND DEPRESSION: ICD-10-CM

## 2022-04-18 NOTE — TELEPHONE ENCOUNTER
Sal Ashton called requesting a refill on the following medications:  Requested Prescriptions     Pending Prescriptions Disp Refills    FLUoxetine (PROZAC) 10 MG tablet [Pharmacy Med Name: FLUoxetine HCl 10 MG Oral Tablet] 30 tablet 0     Sig: Take 1 tablet by mouth once daily       Date of last visit: 3/22/2022  Date of next visit (if applicable):Visit date not found  Date of last refill: 03/22/22  Pharmacy Name: Richa Serna LPN

## 2022-04-19 RX ORDER — FLUOXETINE 10 MG/1
TABLET, FILM COATED ORAL
Qty: 30 TABLET | Refills: 0 | OUTPATIENT
Start: 2022-04-19

## 2022-04-20 ENCOUNTER — OFFICE VISIT (OUTPATIENT)
Dept: FAMILY MEDICINE CLINIC | Age: 37
End: 2022-04-20
Payer: COMMERCIAL

## 2022-04-20 VITALS
SYSTOLIC BLOOD PRESSURE: 124 MMHG | RESPIRATION RATE: 16 BRPM | DIASTOLIC BLOOD PRESSURE: 76 MMHG | BODY MASS INDEX: 23.96 KG/M2 | HEART RATE: 72 BPM | WEIGHT: 167 LBS

## 2022-04-20 DIAGNOSIS — F41.9 ANXIETY AND DEPRESSION: Primary | ICD-10-CM

## 2022-04-20 DIAGNOSIS — F32.A ANXIETY AND DEPRESSION: Primary | ICD-10-CM

## 2022-04-20 DIAGNOSIS — R45.4 IRRITABILITY: ICD-10-CM

## 2022-04-20 DIAGNOSIS — N52.1 ERECTILE DYSFUNCTION DUE TO DISEASES CLASSIFIED ELSEWHERE: ICD-10-CM

## 2022-04-20 PROCEDURE — 99214 OFFICE O/P EST MOD 30 MIN: CPT | Performed by: NURSE PRACTITIONER

## 2022-04-20 RX ORDER — TADALAFIL 10 MG/1
10 TABLET ORAL PRN
Qty: 30 TABLET | Refills: 2 | Status: SHIPPED | OUTPATIENT
Start: 2022-04-20 | End: 2022-07-19

## 2022-04-20 RX ORDER — ARIPIPRAZOLE 30 MG/1
30 TABLET ORAL DAILY
Qty: 90 TABLET | Refills: 0 | Status: SHIPPED | OUTPATIENT
Start: 2022-04-20 | End: 2022-07-20 | Stop reason: SDUPTHER

## 2022-04-20 RX ORDER — FLUOXETINE 20 MG/1
20 TABLET, FILM COATED ORAL DAILY
Qty: 90 TABLET | Refills: 0 | Status: SHIPPED | OUTPATIENT
Start: 2022-04-20 | End: 2022-07-19

## 2022-04-20 ASSESSMENT — ANXIETY QUESTIONNAIRES
2. NOT BEING ABLE TO STOP OR CONTROL WORRYING: 3
5. BEING SO RESTLESS THAT IT IS HARD TO SIT STILL: 1
1. FEELING NERVOUS, ANXIOUS, OR ON EDGE: 1
4. TROUBLE RELAXING: 1
GAD7 TOTAL SCORE: 11
3. WORRYING TOO MUCH ABOUT DIFFERENT THINGS: 3
IF YOU CHECKED OFF ANY PROBLEMS ON THIS QUESTIONNAIRE, HOW DIFFICULT HAVE THESE PROBLEMS MADE IT FOR YOU TO DO YOUR WORK, TAKE CARE OF THINGS AT HOME, OR GET ALONG WITH OTHER PEOPLE: SOMEWHAT DIFFICULT
6. BECOMING EASILY ANNOYED OR IRRITABLE: 2
7. FEELING AFRAID AS IF SOMETHING AWFUL MIGHT HAPPEN: 0

## 2022-04-20 ASSESSMENT — PATIENT HEALTH QUESTIONNAIRE - PHQ9
SUM OF ALL RESPONSES TO PHQ QUESTIONS 1-9: 4
4. FEELING TIRED OR HAVING LITTLE ENERGY: 1
3. TROUBLE FALLING OR STAYING ASLEEP: 0
7. TROUBLE CONCENTRATING ON THINGS, SUCH AS READING THE NEWSPAPER OR WATCHING TELEVISION: 1
9. THOUGHTS THAT YOU WOULD BE BETTER OFF DEAD, OR OF HURTING YOURSELF: 0
SUM OF ALL RESPONSES TO PHQ9 QUESTIONS 1 & 2: 0
5. POOR APPETITE OR OVEREATING: 1
6. FEELING BAD ABOUT YOURSELF - OR THAT YOU ARE A FAILURE OR HAVE LET YOURSELF OR YOUR FAMILY DOWN: 0
8. MOVING OR SPEAKING SO SLOWLY THAT OTHER PEOPLE COULD HAVE NOTICED. OR THE OPPOSITE, BEING SO FIGETY OR RESTLESS THAT YOU HAVE BEEN MOVING AROUND A LOT MORE THAN USUAL: 1
1. LITTLE INTEREST OR PLEASURE IN DOING THINGS: 0
SUM OF ALL RESPONSES TO PHQ QUESTIONS 1-9: 4
2. FEELING DOWN, DEPRESSED OR HOPELESS: 0
10. IF YOU CHECKED OFF ANY PROBLEMS, HOW DIFFICULT HAVE THESE PROBLEMS MADE IT FOR YOU TO DO YOUR WORK, TAKE CARE OF THINGS AT HOME, OR GET ALONG WITH OTHER PEOPLE: 1

## 2022-04-20 NOTE — PROGRESS NOTES
Sal Ashton (:  1985) is a 29 y.o. male,Established patient, here for evaluation of the following chief complaint(s):  Follow-up         ASSESSMENT/PLAN:  1. Anxiety and depression  - Chronic, unstable  - Improving  - Continue aripiprazole  - Increase fluoxetine to 20 mg daily  - Continue with scheduled psychiatry appt  -     FLUoxetine (PROZAC) 20 MG tablet; Take 1 tablet by mouth daily, Disp-90 tablet, R-0Normal  -     ARIPiprazole (ABILIFY) 30 MG tablet; Take 1 tablet by mouth daily, Disp-90 tablet, R-0Normal    2. Irritability  - Chronic, unstable  - Improving  - Continue aripiprazole  - Increase fluoxetine to 20 mg daily  - Continue with scheduled psychiatry appt  -     FLUoxetine (PROZAC) 20 MG tablet; Take 1 tablet by mouth daily, Disp-90 tablet, R-0Normal  -     ARIPiprazole (ABILIFY) 30 MG tablet; Take 1 tablet by mouth daily, Disp-90 tablet, R-0Normal    3. Erectile dysfunction due to diseases classified elsewhere  - Stop Viagra, start prn Cialis  - Take medication 30 minutes before expected sexual activity  -     tadalafil (CIALIS) 10 MG tablet; Take 1 tablet by mouth as needed for Erectile Dysfunction, Disp-30 tablet, R-2Normal      Return in about 6 months (around 10/20/2022) for Anxiety, Depression. Subjective   SUBJECTIVE/OBJECTIVE:  1 month follow up of anxiety and depression. During last visit, abilify dose was increased, and fluoxetine was started in place of Lexapro. Feeling much better overall. Patient's girlfriend has noticed an improvement in his mood. Denies medication SE. Reports continued presence of mood swings. Episodic anxiety and depression continue. Jessica without his son triggered some depressive symptoms. Has upcoming appt with psychiatry on . Reports continued presence of ED although it is improving. Reports intermittent improvement in symptoms with Viagra. Taking 100 mg as needed.      PHQ Scores 2022 3/22/2022 2021   PHQ2 Score 0 4 1 PHQ9 Score 4 13 1     Interpretation of Total Score Depression Severity: 1-4 = Minimal depression, 5-9 = Mild depression, 10-14 = Moderate depression, 15-19 = Moderately severe depression, 20-27 = Severe depression  BIBIANA 7 SCORE 4/20/2022 3/22/2022   BIBIANA-7 Total Score 11 19     Interpretation of BIBIANA-7 score: 5-9 = mild anxiety, 10-14 = moderate anxiety, 15+ = severe anxiety. Recommend referral to behavioral health for scores 10 or greater. Review of Systems   Genitourinary:        Unable to maintain erection   Psychiatric/Behavioral: Positive for dysphoric mood. The patient is nervous/anxious. Objective   Physical Exam  Vitals and nursing note reviewed. Constitutional:       General: He is awake. Appearance: Normal appearance. HENT:      Head: Normocephalic and atraumatic. Right Ear: Hearing and external ear normal.      Left Ear: Hearing and external ear normal.      Nose: Nose normal. No congestion or rhinorrhea. Eyes:      General: Lids are normal.         Right eye: No discharge. Left eye: No discharge. Conjunctiva/sclera: Conjunctivae normal.   Neck:      Trachea: No tracheal deviation. Cardiovascular:      Rate and Rhythm: Normal rate and regular rhythm. Heart sounds: Normal heart sounds. No murmur heard. Pulmonary:      Effort: Pulmonary effort is normal. No respiratory distress. Breath sounds: No stridor. No wheezing. Musculoskeletal:      Cervical back: Full passive range of motion without pain. Skin:     General: Skin is dry. Coloration: Skin is not jaundiced or pale. Neurological:      General: No focal deficit present. Mental Status: He is alert. Mental status is at baseline. Psychiatric:         Mood and Affect: Mood and affect normal.         Behavior: Behavior is cooperative. An electronic signature was used to authenticate this note.     --Yony Sheikh, APRN - CNP

## 2022-04-20 NOTE — PATIENT INSTRUCTIONS
Patient Education        Anxiety Disorder: Care Instructions  Your Care Instructions     Anxiety is a normal reaction to stress. Difficult situations can cause you to have symptoms such as sweaty palms and a nervous feeling. In an anxiety disorder, the symptoms are far more severe. Constant worry, muscle tension, trouble sleeping, nausea and diarrhea, and other symptoms can make normal daily activities difficult or impossible. These symptoms may occur for no reason, and they can affect your work, school, or social life. Medicines, counseling, and self-care can all help. Follow-up care is a key part of your treatment and safety. Be sure to make and go to all appointments, and call your doctor if you are having problems. It's also a good idea to know your test results and keep a list of the medicines you take. How can you care for yourself at home? · Take medicines exactly as directed. Call your doctor if you think you are having a problem with your medicine. · Go to your counseling sessions and follow-up appointments. · Recognize and accept your anxiety. Then, when you are in a situation that makes you anxious, say to yourself, \"This is not an emergency. I feel uncomfortable, but I am not in danger. I can keep going even if I feel anxious. \"  · Be kind to your body:  ? Relieve tension with exercise or a massage. ? Get enough rest.  ? Avoid alcohol, caffeine, nicotine, and illegal drugs. They can increase your anxiety level and cause sleep problems. ? Learn and do relaxation techniques. See below for more about these techniques. · Engage your mind. Get out and do something you enjoy. Go to a funny movie, or take a walk or hike. Plan your day. Having too much or too little to do can make you anxious. · Keep a record of your symptoms. Discuss your fears with a good friend or family member, or join a support group for people with similar problems. Talking to others sometimes relieves stress.   · Get involved in social groups, or volunteer to help others. Being alone sometimes makes things seem worse than they are. · Get at least 30 minutes of exercise on most days of the week to relieve stress. Walking is a good choice. You also may want to do other activities, such as running, swimming, cycling, or playing tennis or team sports. Relaxation techniques  Do relaxation exercises 10 to 20 minutes a day. You can play soothing, relaxing music while you do them, if you wish. · Tell others in your house that you are going to do your relaxation exercises. Ask them not to disturb you. · Find a comfortable place, away from all distractions and noise. · Lie down on your back, or sit with your back straight. · Focus on your breathing. Make it slow and steady. · Breathe in through your nose. Breathe out through either your nose or mouth. · Breathe deeply, filling up the area between your navel and your rib cage. Breathe so that your belly goes up and down. · Do not hold your breath. · Breathe like this for 5 to 10 minutes. Notice the feeling of calmness throughout your whole body. As you continue to breathe slowly and deeply, relax by doing the following for another 5 to 10 minutes:  · Tighten and relax each muscle group in your body. You can begin at your toes and work your way up to your head. · Imagine your muscle groups relaxing and becoming heavy. · Empty your mind of all thoughts. · Let yourself relax more and more deeply. · Become aware of the state of calmness that surrounds you. · When your relaxation time is over, you can bring yourself back to alertness by moving your fingers and toes and then your hands and feet and then stretching and moving your entire body. Sometimes people fall asleep during relaxation, but they usually wake up shortly afterward. · Always give yourself time to return to full alertness before you drive a car or do anything that might cause an accident if you are not fully alert.  Never play a relaxation tape while you drive a car. When should you call for help? Call 911 anytime you think you may need emergency care. For example, call if:    · You feel you cannot stop from hurting yourself or someone else. Keep the numbers for these national suicide hotlines: 1-537-684-TALK (1-707.217.1903) and 9-867-KIVFVMH (6-885.591.2082). If you or someone you know talks about suicide or feeling hopeless, get help right away. Watch closely for changes in your health, and be sure to contact your doctor if:    · You have anxiety or fear that affects your life.     · You have symptoms of anxiety that are new or different from those you had before. Where can you learn more? Go to https://RealityMine.Claro. org and sign in to your Weele account. Enter P754 in the [employer] box to learn more about \"Anxiety Disorder: Care Instructions. \"     If you do not have an account, please click on the \"Sign Up Now\" link. Current as of: September 23, 2020               Content Version: 12.9  © 3980-4397 [employer]. Care instructions adapted under license by [facility] ([facility]). If you have questions about a medical condition or this instruction, always ask your healthcare professional. [address] any warranty or liability for your use of this information. Patient Education        Recovering From Depression: Care Instructions  Your Care Instructions     Taking good care of yourself is important as you recover from depression. In time, your symptoms will fade as your treatment takes hold. Do not give up. Instead, focus your energy on getting better. Your mood will improve. It just takes some time. Focus on things that can help you feel better, such as being with friends and family, eating well, and getting enough rest. But take things slowly. Do not do too much too soon. Youwill begin to feel better gradually.   Follow-up care is a key part of your treatment and safety. Be sure to make and go to all appointments, and call your doctor if you are having problems. It's also a good idea to know your test results and keep alist of the medicines you take. How can you care for yourself at home? Be realistic   If you have a large task to do, break it up into smaller steps you can handle, and just do what you can.  You may want to put off important decisions until your depression has lifted. If you have plans that will have a major impact on your life, such as marriage, divorce, or a job change, try to wait a bit. Talk it over with friends and loved ones who can help you look at the overall picture first.   Reaching out to people for help is important. Do not isolate yourself. Let your family and friends help you. Find someone you can trust and confide in, and talk to that person.  Be patient, and be kind to yourself. Remember that depression is not your fault and is not something you can overcome with willpower alone. Treatment is important for depression, just like for any other illness. Feeling better takes time, and your mood will improve little by little. Stay active   Stay busy and get outside. Take a walk, or try some other light exercise.  Talk with your doctor about an exercise program. Exercise can help with mild depression.  Go to a movie or concert. Take part in a Jehovah's witness activity or other social gathering. Go to a ball game.  Ask a friend to have dinner with you. Take care of yourself   Eat a balanced diet with plenty of fresh fruits and vegetables, whole grains, and lean protein. If you have lost your appetite, eat small snacks rather than large meals.  Avoid using illegal drugs or marijuana and drinking alcohol. Do not take medicines that have not been prescribed for you. They may interfere with medicines you may be taking for depression, or they may make your depression worse.  Take your medicines exactly as they are prescribed.  You may start to feel better within 1 to 3 weeks of taking antidepressant medicine. But it can take as many as 6 to 8 weeks to see more improvement. If you have questions or concerns about your medicines, or if you do not notice any improvement by 3 weeks, talk to your doctor.  Continue to take your medicine after your symptoms improve. Taking your medicine for at least 6 months after you feel better can help keep you from getting depressed again. If this isn't the first time you have been depressed, your doctor may recommend you to take medicine even longer.  If you have any side effects from your medicine, tell your doctor. Many side effects are mild and will go away on their own after you have been taking the medicine for a few weeks. Some may last longer. Talk to your doctor if side effects are bothering you too much. You might be able to try a different medicine.  Continue counseling. It may help prevent depression from returning, especially if you've had multiple episodes of depression. Talk with your counselor if you are having a hard time attending your sessions or you think the sessions aren't working. Don't just stop going.  Get enough sleep. Talk to your doctor if you are having problems sleeping.  Avoid sleeping pills unless they are prescribed by the doctor treating your depression. Sleeping pills may make you groggy during the day, and they may interact with other medicine you are taking.  If you have any other illnesses, such as diabetes, heart disease, or high blood pressure, make sure to continue with your treatment. Tell your doctor about all of the medicines you take, including those with or without a prescription.  If you or someone you know talks about suicide, self-harm, or feeling hopeless, get help right away. Call the [facility] at 1-800-273-talk (5-419.180.6221) or text HOME to 810841 to access the Crisis Text Line.  Consider saving these numbers in your phone.  When should you call for help? Call 911 anytime you think you may need emergency care. For example, call if:     You feel like hurting yourself or someone else.      Someone you know has depression and is about to attempt or is attempting suicide. Call your doctor now or seek immediate medical care if:     You hear voices.      Someone you know has depression and:  ? Starts to give away his or her possessions. ? Uses illegal drugs or drinks alcohol heavily. ? Talks or writes about death, including writing suicide notes or talking about guns, knives, or pills. ? Starts to spend a lot of time alone. ? Acts very aggressively or suddenly appears calm. Watch closely for changes in your health, and be sure to contact your doctor if:     You do not get better as expected. Where can you learn more? Go to https://DDRdrivesteffanieeb.Care2Manage. org and sign in to your [employer] account. Enter H773 in the [employer] box to learn more about \"Recovering From Depression: Care Instructions. \"     If you do not have an account, please click on the \"Sign Up Now\" link. Current as of: June 16, 2021               Content Version: 13.2  © 9241-5639 [employer]. Care instructions adapted under license by [facility] [place] ([facility]). If you have questions about a medical condition or this instruction, always ask your healthcare professional. [place] [address] any warranty or liability for your use of this information. Patient Education        Depression Treatment: Care Instructions  Your Care Instructions     Depression is a condition that affects the way you feel, think, and act. It causes symptoms such as low energy, loss of interest in daily activities, and sadness or grouchiness that goes on for a long time. Depression is very commonand affects men and women of all ages. Depression is a medical illness caused by changes in the natural chemicals in your brain.  It is not a character flaw, and it does not mean that you are a bador weak person. It does not mean that you are going crazy. It is important to know that depression can be treated. Medicines, counseling, and self-care can all help. Many people do not get help because they are embarrassed or think that they will get over the depression on their own. Butsome people do not get better without treatment. Follow-up care is a key part of your treatment and safety. Be sure to make and go to all appointments, and call your doctor if you are having problems. It's also a good idea to know your test results and keep alist of the medicines you take. How can you care for yourself at home? Learn about antidepressant medicines  Antidepressant medicines can improve or end the symptoms of depression. You may need to take the medicine for at least 6 months, and often longer. Keep taking your medicine even if you feel better. If you stop taking it too soon, yoursymptoms may come back or get worse. You may start to feel better within 1 to 3 weeks of taking antidepressant medicine. But it can take as many as 6 to 8 weeks to see more improvement. Talk to your doctor if you have problems with your medicine or if you do not noticeany improvement after 3 weeks. Antidepressants can make you feel tired, dizzy, or nervous. Some people have dry mouth, constipation, headaches, sexual problems, an upset stomach, or diarrhea. Many of these side effects are mild and go away on their own after you take the medicine for a few weeks. Some may last longer. Talk to your doctor if side effects bother you too much. You might be able to try a different medicine. If you are pregnant or breastfeeding, talk to your doctorabout what medicines you can take. Learn about counseling  In many cases, counseling can work as well as medicines to treat mild to moderate depression.  Counseling is done by licensed mental health providers, such as psychologists, social workers, and some types of nurses. It can be done in one-on-one sessions or in a group setting. Many people find group sessionshelpful. Cognitive-behavioral therapy is a type of counseling. In this treatment, you learn how to see and change unhelpful thinking styles that may be adding toyour depression. Counseling and medicines often work well when used together. When should you call for help? Call 911 anytime you think you may need emergency care. For example, call if:     You feel you cannot stop from hurting yourself or someone else. Call your doctor now or seek immediate medical care if:     You hear voices.      You feel much more depressed. Watch closely for changes in your health, and be sure to contact your doctor if:     You are having problems with your depression medicine.      You are not getting better as expected. Where can you learn more? Go to https://People Interactive ([employer]Etown [employer] Services.Hello Health. org and sign in to your FilesX account. Enter J740 in the [employer] box to learn more about \"Depression Treatment: Care Instructions. \"     If you do not have an account, please click on the \"Sign Up Now\" link. Current as of: June 16, 2021               Content Version: 13.2  © 2006-2022 [employer]. Care instructions adapted under license by [facility] ([facility]). If you have questions about a medical condition or this instruction, always ask your healthcare professional. [place] [address] any warranty or liability for your use of this information. Patient Education        Pneumococcal Polysaccharide Vaccine: What You Need to Know  Why get vaccinated? Pneumococcal polysaccharide vaccine (PPSV23) can prevent pneumococcal disease. Pneumococcal disease refers to any illness caused by pneumococcal bacteria. These bacteria can cause many types of illnesses, including pneumonia, which is an infection ofthe lungs.  Pneumococcal bacteria are one of the most common causes of pneumonia. Besides pneumonia, pneumococcal bacteria can also cause:   Ear infections,   Sinus infections   Meningitis (infection of the tissue covering the brain and spinal cord)   Bacteremia (bloodstream infection)  Anyone can get pneumococcal disease, but children under 3years of age, people with certain medical conditions, adults 72 years or older, and cigarettesmokers are at the highest risk. Most pneumococcal infections are mild. However, some can result in long-term problems, such as brain damage or hearing loss. Meningitis, bacteremia, andpneumonia caused by pneumococcal disease can be fatal.  PPSV23  PPSV23 protects against 23 types of bacteria that cause pneumococcal disease. PPSV23 is recommended for:   All adults 72 years or older,   Anyone 2 years or older with certain medical conditions that can lead to an increased risk for pneumococcal disease. Most people need only one dose of PPSV23. A second dose of PPSV23, and another type of pneumococcal vaccine called PCV13, are recommended for certainhigh-risk groups. Your health care provider can give you more information. People 65 years or older should get a dose of PPSV23 even if they have alreadygotten one or more doses of the vaccine before they turned 65. Talk with your health care provider  Tell your vaccine provider if the person getting the vaccine:   Has had an allergic reaction after a previous dose of PPSV23, or has any severe, life-threatening allergies. In some cases, your health care provider may decide to postpone [id number] to a future visit. People with minor illnesses, such as a cold, may be vaccinated. People who are moderately or severely ill should usually wait until they recover beforegetting PPSV23. Your health care provider can give you more information. Risks of a vaccine reaction   Redness or pain where the shot is given, feeling tired, fever, or muscle aches can happen after PPSV23.   People sometimes faint after medical procedures, including vaccination. Tellyour provider if you feel dizzy or have vision changes or ringing in the ears. As with any medicine, there is a very remote chance of a vaccine causing asevere allergic reaction, other serious injury, or death. What if there is a serious problem? An allergic reaction could occur after the vaccinated person leaves the clinic. If you see signs of a severe allergic reaction (hives, swelling of the face and throat, difficulty breathing, a fast heartbeat, dizziness, or weakness), call 9-1-1 and get the person to the nearest hospital.  For other signs that concern you, call your health care provider. Adverse reactions should be reported to the Vaccine Adverse Event Reporting System (VAERS). Your health care provider will usually file this report, or you can do it yourself. Visit the VAERS website at www.vaers. hhs.gov at www.vaers. hhs.gov or call 3-478.196.7907. VAERS is only for reporting reactions, and VAERS staff do not give medical advice. How can I learn more?  Ask your health care provider.  Call your local or state health department.  Contact the Centers for Disease Control and Prevention (CDC):  ? Call 4-333.252.7187 (1-800-CDC-INFO) or  ? Visit CDC's website at www.cdc.gov/vaccines  Vaccine Information Statement  PPSV23 Vaccine  10/30/2019  Department of [facility] and [facility] for Disease Control and Prevention  Many Vaccine Information Statements are available in Macedonian and other languages. See www.immunize.org/vis. Hojas de información Sobre Vacunas están disponibles en español y en muchos otros idiomas. Visite Farrah.si. Care instructions adapted under license by [facility] ([facility]). If you have questions about a medical condition or this instruction, always ask your healthcare professional. [place] [address] any warranty or liability for your use of this information.          Patient Education Erection Problems: Care Instructions  Overview     An erection problem means that you routinely can't get or keep an erection that allows satisfactory sex. You may not be able to have an erection at any time. Or you may not be able to have one that is firm enough or lasts long enough tocomplete intercourse. The problem is also called erectile dysfunction (ED). It's not the same as having trouble getting an erection now and then. That'scommon. Erection problems can be caused by problems with the blood vessels, nerves, or hormones. They can be caused by diabetes, heart disease, and injuries. Nerveproblems also can cause them. Medicines, alcohol, and tobacco also can cause problems. So can depression,stress, grief, and relationship problems. Follow-up care is a key part of your treatment and safety. Be sure to make and go to all appointments, and call your doctor if you are having problems. It's also a good idea to know your test results and keep alist of the medicines you take. How can you care for yourself at home? Lifestyle     Limit alcohol. Have no more than 2 drinks a day.      Do not smoke. Smoking makes it harder for the blood vessels in the penis to relax and let blood flow in. If you need help quitting, talk to your doctor about stop-smoking programs and medicines. These can increase your chances of quitting for good.      Do not use cocaine, heroin, or other illegal drugs.      Try to reduce stress.      Give yourself time to adjust to change. Changes in your job, family, relationships, home life, and other areas can cause stress. And stress can cause erection problems. Work with your partner     Talk with your partner about what time of day works best for having sex. Mornings may be a good time since you are both rested.  Also, some medicines that help with erections need to be taken on an empty stomach.      If either of you are too tired or have a lot on your mind, wait until another time to have sex.      Ask what your partner likes when it comes to sex. Talk about what each of you does and does not enjoy.      Make time outside of the bedroom to talk about your sex life. If you avoid sex because you are afraid of having erection problems, your partner may worry that you are no longer interested.      If you and your partner have trouble talking about sex, see a therapist. They may help you talk about it. Reading books with your partner about sexual health may also help.      Relax. Take time for more foreplay. Worrying about your erections may only make things worse. Medicines     Tell your doctor about all the medicines that you take. ? Some medicines can cause erection problems. ? Some medicines can have dangerous interactions with medicines that are prescribed for erection problems. These include over-the-counter medicines and herbal products.      Be safe with medicines. Take your medicines exactly as prescribed. Call your doctor if you think you are having a problem with your medicine.      Talk to your doctor about trying a medicine to help you keep an erection. This could be a medicine like sildenafil (such as Viagra), tadalafil (such as Cialis), or vardenafil (such as Levitra). If you have a heart problem, ask your doctor if these are safe for you. Do not take these medicines if you take nitroglycerin or other nitrate medicine. When should you call for help? Call your doctor now or seek immediate medical care if:     You took a medicine for erection problems and you have an erection that lasts longer than 3 hours. Watch closely for changes in your health, and be sure to contact your doctor ifyou have any problems. Where can you learn more? Go to https://bhargav.healthOmthera Pharmaceuticals. org and sign in to your NeuroVista account. Enter [id number] in the Fitmo box to learn more about \"Erection Problems: Care Instructions. \"     If you do not have an account, please click on the \"Sign Up Now\" link. Current as of: February 10, 2021               Content Version: 13.2  © 2006-2022 [employer]. Care instructions adapted under license by [facility] ([facility]). If you have questions about a medical condition or this instruction, always ask your healthcare professional. [place] [address] any warranty or liability for your use of this information.

## 2022-05-31 ENCOUNTER — TELEMEDICINE (OUTPATIENT)
Dept: FAMILY MEDICINE CLINIC | Age: 37
End: 2022-05-31
Payer: COMMERCIAL

## 2022-05-31 DIAGNOSIS — J06.9 VIRAL URI: Primary | ICD-10-CM

## 2022-05-31 PROCEDURE — 99212 OFFICE O/P EST SF 10 MIN: CPT | Performed by: NURSE PRACTITIONER

## 2022-05-31 ASSESSMENT — ENCOUNTER SYMPTOMS
SHORTNESS OF BREATH: 0
CHEST TIGHTNESS: 0
BACK PAIN: 0
RHINORRHEA: 0
VOMITING: 0
EYE PAIN: 0
EYE ITCHING: 0
SORE THROAT: 0
ABDOMINAL PAIN: 0
NAUSEA: 0
EYE REDNESS: 0
COUGH: 1
CONSTIPATION: 0
DIARRHEA: 1
WHEEZING: 0
EYE DISCHARGE: 0
SINUS PRESSURE: 0
TROUBLE SWALLOWING: 0

## 2022-05-31 NOTE — PATIENT INSTRUCTIONS
Continue taking the Mucinex.  nasal spray-Nasocort or Flonase. Drink fluids and follow a BRAT diet until the diarrhea is resolved  (Bananas, rice, applesauce, and toast).

## 2022-05-31 NOTE — PROGRESS NOTES
Sara Hampton (:  1985) is a Established patient, here for evaluation of the following:    Assessment & Plan   Below is the assessment and plan developed based on review of pertinent history, physical exam, labs, studies, and medications. 1. Viral URI    Return if symptoms worsen or fail to improve. Subjective   Cough  This is a new problem. The current episode started yesterday. The problem has been gradually worsening. The cough is non-productive. Pertinent negatives include no chest pain, chills, ear pain, eye redness, fever, headaches, myalgias, postnasal drip, rhinorrhea, sore throat, shortness of breath or wheezing. Associated symptoms comments: Complains of chest congestion, nasal congestion, and diarrhea. . Treatments tried: Muccinex. Review of Systems   Constitutional: Negative for activity change, appetite change, chills, fatigue and fever. HENT: Positive for congestion. Negative for ear discharge, ear pain, hearing loss, postnasal drip, rhinorrhea, sinus pressure, sore throat and trouble swallowing. Eyes: Negative for pain, discharge, redness and itching. Respiratory: Positive for cough. Negative for chest tightness, shortness of breath and wheezing. Cardiovascular: Negative for chest pain, palpitations and leg swelling. Gastrointestinal: Positive for diarrhea. Negative for abdominal pain, constipation, nausea and vomiting. Endocrine: Negative. Genitourinary: Negative for dysuria, flank pain, frequency and hematuria. Musculoskeletal: Negative for arthralgias, back pain, joint swelling and myalgias. Skin: Negative. Neurological: Negative for dizziness, light-headedness and headaches. Hematological: Negative. Objective   Patient-Reported Vitals  No data recorded     Physical Exam  Constitutional:       Appearance: He is ill-appearing. HENT:      Nose: Congestion present.       Mouth/Throat:      Mouth: Mucous membranes are moist.      Pharynx: Posterior oropharyngeal erythema present. Neurological:      Mental Status: He is alert. Nicole Baron, was evaluated through a synchronous (real-time) audio-video encounter. The patient (or guardian if applicable) is aware that this is a billable service, which includes applicable co-pays. This Virtual Visit was conducted with patient's (and/or legal guardian's) consent. The visit was conducted pursuant to the emergency declaration under the [address] [place] [address], [address] [place] [address] authority and the [employer] Act. Patient identification was verified, and a caregiver was present when appropriate. The patient was located at Home: [address] [place] [address]. Provider was located at [facility] ([facility] Dept): [address] [place] [address],  [address] [place] [address], [facility]

## 2022-05-31 NOTE — LETTER
[address] Medicine  [address] [facility]  Phone: [phone]  Fax: Jax [phone], APRN - CNP        May 31, 2022     Patient: Radha Varma   YOB: 1985   Date of Visit: 5/31/2022       To Whom It May Concern: It is my medical opinion that Da Acosta should remain out of work until 6/3/22. If you have any questions or concerns, please don't hesitate to call.     Sincerely,        Shanna Fournier, LILIAM - CNP

## 2022-07-20 ENCOUNTER — OFFICE VISIT (OUTPATIENT)
Dept: PSYCHIATRY | Age: 37
End: 2022-07-20

## 2022-07-20 DIAGNOSIS — F41.1 GAD (GENERALIZED ANXIETY DISORDER): ICD-10-CM

## 2022-07-20 DIAGNOSIS — R45.4 IRRITABILITY: ICD-10-CM

## 2022-07-20 DIAGNOSIS — F41.9 ANXIETY AND DEPRESSION: ICD-10-CM

## 2022-07-20 DIAGNOSIS — F32.A ANXIETY AND DEPRESSION: ICD-10-CM

## 2022-07-20 DIAGNOSIS — F31.81 BIPOLAR II DISORDER (HCC): Primary | ICD-10-CM

## 2022-07-20 PROCEDURE — 99204 OFFICE O/P NEW MOD 45 MIN: CPT | Performed by: NURSE PRACTITIONER

## 2022-07-20 RX ORDER — ARIPIPRAZOLE 30 MG/1
30 TABLET ORAL DAILY
Qty: 90 TABLET | Refills: 0 | Status: SHIPPED | OUTPATIENT
Start: 2022-07-20 | End: 2022-10-12

## 2022-07-20 RX ORDER — FLUOXETINE HYDROCHLORIDE 40 MG/1
40 CAPSULE ORAL DAILY
Qty: 30 CAPSULE | Refills: 1 | Status: SHIPPED | OUTPATIENT
Start: 2022-07-20 | End: 2022-10-11 | Stop reason: SDUPTHER

## 2022-07-20 NOTE — PROGRESS NOTES
Psychiatry H&P              7-     CC: ipolar II D/O  BIBIANA     HPI   Jewell Laser is seen mariela initial [facility] evaluation for medication management. Rxed  Abilify and Prozac from PCP. Reports meds are working \"ok\" but reports need for med adjustment. Reports having anxiety. Reports being on Abilify for 2 years. And Prozac x 90 days. Denies any side effects from meds. Reports good med compliance. Denies having depression. Denies really having mood swings. Denies feelings of harm towards self or others. Reports eating and sleeping well. Reports having several stressors, primarily financail. As is unemployed. Denies need for counseling. Past Medical Hx:  Past Medical History:   Diagnosis Date    Chicken pox     No Known Allergies     Past Psychiatric Hx:  Denies any past psych hospitalizations. Denies any past suicidal gestures Reports saw psychiatrist at 12 y/o in [address] [place] [address]. Rxed Prozac and abilify from PCP  Past psych meds: Lithium     Family Hx:  Reports sister has Bipolar D/O      Social Hx:  TOBACCO: Reports being 1/3 pk day smoker  ETOH:  Reports alcohol use 2 x month   DRUGS: Denies use of illicit substates nut reports did use MJ years ago   MARITAL STATUS: Never  but has girlfriend   OCCUPATION: Unemployed  LEVEL OF EDUCATION: Has some college  LIVING SITUATION: Lives in [place], [place] with sister and brother in law. Reports has 1 bio son and has shred parenting      MSE:  Level of consciousness: Alert  Appearance: in chair and fair grooming   Behavior/Motor: no abnormalities noted   Attitude toward examiner: cooperative   Speech: Normal volume, goal directed, NRR  Mood: Euthymic  Affect: Reactive  Thought processes: Linear and goal directed   Suicidal Ideation: Denies suicidal ideations  Homicidal ideation: Denies homicidal ideations  Delusions: No evidence of delusions is observed  Perceptual Disturbance: Denies AH/VH;  No evidence of psychosis is observed.   Cognition: Oriented to person, place, time and situation   Concentration fair   Memory intact   Insight: Fair  Judgment: Fair     ROS:  Constitutional: Negative for appetite change, diaphoresis, fatigue and fever. HENT: Negative for congestion, sore throat and tinnitus. Eyes: Negative for visual disturbance. Respiratory: Negative for cough, shortness of breath and wheezing. Cardiovascular: Negative for chest pain and leg swelling. Gastrointestinal: Negative for nausea, vomiting, diarrhea. Negative for abdominal pain. Genitourinary: Negative for frequency. Musculoskeletal: Negative for arthralgias, myalgias and neck stiffness. Skin: Negative for puritis. Neurological: Negative for dizziness, weakness and headaches. All other systems reviewed and are negative. AIMS: No abnormal or involuntary movements reported or observed     Impression:  Bipolar II D/O  BIBIANA       Plan:   Increase Prozac 40mg po q am for anxiety   Cont Abilify 30mg po q am   Medication education given. Client voiced Eleuterio Pavon   Denies need for counseling  Order labs today CBC with Diff, CMP, TSH, Lipids draw asap  Marzena Lucero advised to call 911 and or go to nearest ED if symptoms worsen or has feelings of harm towards self or  others. Marzena Lucero voiced understanding and agreement with safety plan. RTC 6 weeks;  Sooner if needed

## 2022-09-23 RX ORDER — FLUOXETINE HYDROCHLORIDE 40 MG/1
40 CAPSULE ORAL DAILY
Qty: 30 CAPSULE | Refills: 0 | OUTPATIENT
Start: 2022-09-23

## 2022-09-26 RX ORDER — FLUOXETINE HYDROCHLORIDE 40 MG/1
40 CAPSULE ORAL DAILY
Qty: 30 CAPSULE | Refills: 0 | OUTPATIENT
Start: 2022-09-26

## 2022-10-10 DIAGNOSIS — R45.4 IRRITABILITY: ICD-10-CM

## 2022-10-10 DIAGNOSIS — F32.A ANXIETY AND DEPRESSION: ICD-10-CM

## 2022-10-10 DIAGNOSIS — F41.9 ANXIETY AND DEPRESSION: ICD-10-CM

## 2022-10-10 NOTE — TELEPHONE ENCOUNTER
Yaya Luther contacted office requesting a refill on prozac 40mg, he has been out a week and 1/2. Was seen on 7/20, future appt is on 11/23.  Loaded pending your approval.

## 2022-10-11 RX ORDER — FLUOXETINE HYDROCHLORIDE 40 MG/1
40 CAPSULE ORAL DAILY
Qty: 30 CAPSULE | Refills: 1 | Status: SHIPPED | OUTPATIENT
Start: 2022-10-11

## 2022-10-12 ENCOUNTER — TELEPHONE (OUTPATIENT)
Dept: FAMILY MEDICINE CLINIC | Age: 37
End: 2022-10-12

## 2022-10-12 DIAGNOSIS — F32.A ANXIETY AND DEPRESSION: ICD-10-CM

## 2022-10-12 DIAGNOSIS — F41.9 ANXIETY AND DEPRESSION: ICD-10-CM

## 2022-10-12 DIAGNOSIS — R45.4 IRRITABILITY: ICD-10-CM

## 2022-10-12 RX ORDER — ARIPIPRAZOLE 30 MG/1
30 TABLET ORAL DAILY
Qty: 90 TABLET | Refills: 0 | Status: SHIPPED | OUTPATIENT
Start: 2022-10-12 | End: 2023-01-10

## 2022-10-12 NOTE — TELEPHONE ENCOUNTER
Hoa is requesting a medication refill on Mario's behalf for Abilify 30mg;#90 with 0 refills;last with a start date of 07/20/22. Medication is pending your approval for a 90 day supply with 0 refills; he is scheduled to return on 11/23/2022. Last seen on 07/20/22 as a new patient.

## 2022-10-12 NOTE — TELEPHONE ENCOUNTER
Psychiatric conditions and medications being managed by psychiatry. Will leave medication adjustments and refills to them. Appt scheduled with me next week.  Will treat as annual physical.

## 2022-10-12 NOTE — TELEPHONE ENCOUNTER
Tried to call patient at the [phone] number and it was disconnected, tried to call 21  number and person that answered the phone stated that I have the wrong number.      Will try to send [facility]

## 2022-10-12 NOTE — TELEPHONE ENCOUNTER
Patient needs a refill of his PROZAC 40 MG and his ABILIFY 30 MG sent to [facility] on [address] in [facility]

## 2022-10-14 ENCOUNTER — HOSPITAL ENCOUNTER (EMERGENCY)
Age: 37
Discharge: HOME | End: 2022-10-14

## 2022-10-14 VITALS
RESPIRATION RATE: 18 BRPM | HEART RATE: 64 BPM | TEMPERATURE: 97.8 F | SYSTOLIC BLOOD PRESSURE: 119 MMHG | OXYGEN SATURATION: 97 % | DIASTOLIC BLOOD PRESSURE: 69 MMHG

## 2022-10-14 DIAGNOSIS — R11.2 NAUSEA VOMITING AND DIARRHEA: ICD-10-CM

## 2022-10-14 DIAGNOSIS — J01.10 ACUTE FRONTAL SINUSITIS, RECURRENCE NOT SPECIFIED: Primary | ICD-10-CM

## 2022-10-14 DIAGNOSIS — R19.7 NAUSEA VOMITING AND DIARRHEA: ICD-10-CM

## 2022-10-14 PROCEDURE — 99213 OFFICE O/P EST LOW 20 MIN: CPT | Performed by: NURSE PRACTITIONER

## 2022-10-14 PROCEDURE — 99213 OFFICE O/P EST LOW 20 MIN: CPT

## 2022-10-14 RX ORDER — AMOXICILLIN AND CLAVULANATE POTASSIUM 875; 125 MG/1; MG/1
1 TABLET, FILM COATED ORAL 2 TIMES DAILY
Qty: 14 TABLET | Refills: 0 | Status: SHIPPED | OUTPATIENT
Start: 2022-10-14 | End: 2022-10-21

## 2022-10-14 RX ORDER — ONDANSETRON 4 MG/1
4 TABLET, ORALLY DISINTEGRATING ORAL EVERY 8 HOURS PRN
Qty: 30 TABLET | Refills: 0 | Status: SHIPPED | OUTPATIENT
Start: 2022-10-14

## 2022-10-14 ASSESSMENT — ENCOUNTER SYMPTOMS
SORE THROAT: 1
VOMITING: 1
SHORTNESS OF BREATH: 0
ABDOMINAL PAIN: 1
RHINORRHEA: 1
COUGH: 1
SINUS PRESSURE: 1
CHEST TIGHTNESS: 0
DIARRHEA: 1
NAUSEA: 1

## 2022-10-14 ASSESSMENT — PAIN - FUNCTIONAL ASSESSMENT: PAIN_FUNCTIONAL_ASSESSMENT: 0-10

## 2022-10-14 ASSESSMENT — PAIN DESCRIPTION - LOCATION: LOCATION: ABDOMEN

## 2022-10-14 ASSESSMENT — PAIN SCALES - GENERAL: PAINLEVEL_OUTOF10: 6

## 2022-10-14 NOTE — ED PROVIDER NOTES
[address]  Urgent Care Encounter       CHIEF COMPLAINT       Chief Complaint   Patient presents with    Emesis       Nurses Notes reviewed and I agree except as noted in the HPI. HISTORY OF PRESENT ILLNESS   Chan Mccormick is a 29 y.o. male who presents to the [facility] care center for evaluation of emesis. He reports his symptoms started roughly 2 to 3 days ago. He reports symptoms started with nasal congestion, rhinorrhea, postnasal drainage, and cough. He does report sinus pressure. He denies fever or chills. He does report nausea, vomiting, and diarrhea. He does report cramping around the episodes of emesis and diarrhea. He reports he did have to leave work today. He does report that there is illness in his home, but have tested negative for COVID and flu. The history is provided by the patient. No  was used. REVIEW OF SYSTEMS     Review of Systems   Constitutional:  Negative for activity change, appetite change, chills, fatigue and fever. HENT:  Positive for congestion, postnasal drip, rhinorrhea, sinus pressure and sore throat. Negative for ear discharge and ear pain. Respiratory:  Positive for cough. Negative for chest tightness and shortness of breath. Cardiovascular:  Negative for chest pain. Gastrointestinal:  Positive for abdominal pain, diarrhea, nausea and vomiting. Genitourinary:  Negative for dysuria. Skin:  Negative for rash. Allergic/Immunologic: Negative for environmental allergies and food allergies. Neurological:  Negative for dizziness and headaches. PAST MEDICAL HISTORY         Diagnosis Date    Chicken pox        SURGICALHISTORY     Patient  has a past surgical history that includes hernia repair.     CURRENT MEDICATIONS       Previous Medications    ARIPIPRAZOLE (ABILIFY) 30 MG TABLET    TAKE 1 TABLET BY MOUTH IN THE MORNING    FLUOXETINE (PROZAC) 20 MG TABLET    Take 1 tablet by mouth daily    FLUOXETINE (PROZAC) 40 MG CAPSULE    Take 1 capsule by mouth daily    OMEPRAZOLE (PRILOSEC) 40 MG DELAYED RELEASE CAPSULE    Take 1 capsule by mouth daily    TADALAFIL (CIALIS) 10 MG TABLET    Take 1 tablet by mouth as needed for Erectile Dysfunction       ALLERGIES     Patient is has No Known Allergies. Patients   Immunization History   Administered Date(s) Administered    Tdap (Boostrix, Adacel) 12/22/2014       FAMILY HISTORY     Patient's family history includes Cancer in his maternal aunt and maternal grandfather; Other in his maternal grandmother and maternal uncle. SOCIAL HISTORY     Patient  reports that he has been smoking cigarettes and cigars. He started smoking about 3 years ago. He has a 1.50 pack-year smoking history. He has quit using smokeless tobacco. He reports current alcohol use of about 2.0 standard drinks per week. He reports that he does not currently use drugs after having used the following drugs: Marijuana Dietra Due). PHYSICAL EXAM     ED TRIAGE VITALS  BP: 119/69, Temp: 97.8 °F (36.6 °C), Heart Rate: 64, Resp: 18, SpO2: 97 %,Estimated body mass index is 23.96 kg/m² as calculated from the following:    Height as of 12/15/21: 5' 10\" (1.778 m). Weight as of 4/20/22: 167 lb (75.8 kg). ,No LMP for male patient. Physical Exam  Vitals and nursing note reviewed. Constitutional:       General: He is not in acute distress. Appearance: Normal appearance. He is not ill-appearing, toxic-appearing or diaphoretic. HENT:      Head: Normocephalic. Right Ear: Ear canal and external ear normal.      Left Ear: Ear canal and external ear normal.      Nose: No congestion or rhinorrhea. Right Sinus: Frontal sinus tenderness present. Left Sinus: Frontal sinus tenderness present. Mouth/Throat:      Mouth: Mucous membranes are moist.      Pharynx: Oropharynx is clear. No oropharyngeal exudate or posterior oropharyngeal erythema. Cardiovascular:      Rate and Rhythm: Normal rate. Pulses: Normal pulses. Pulmonary:      Effort: Pulmonary effort is normal. No respiratory distress. Breath sounds: No stridor. No wheezing or rhonchi. Abdominal:      General: Abdomen is flat. Bowel sounds are normal.      Palpations: Abdomen is soft. Musculoskeletal:         General: No swelling or tenderness. Normal range of motion. Cervical back: Normal range of motion. Neurological:      General: No focal deficit present. Mental Status: He is alert and oriented to person, place, and time. Psychiatric:         Mood and Affect: Mood normal.         Behavior: Behavior normal.       DIAGNOSTIC RESULTS     Labs:No results found for this visit on 10/14/22. IMAGING:    No orders to display         EKG: None      URGENT CARE COURSE:     Vitals:    10/14/22 0843   BP: 119/69   Pulse: 64   Resp: 18   Temp: 97.8 °F (36.6 °C)   TempSrc: Temporal   SpO2: 97%       Medications - No data to display         PROCEDURES:  None    FINAL IMPRESSION      1. Acute frontal sinusitis, recurrence not specified    2. Nausea vomiting and diarrhea          DISPOSITION/ PLAN     Patient seen and evaluated for the above symptoms. Assessment consistent with likely acute frontal sinusitis along with NVD. Patient is provided prescriptions for Augmentin and Zofran. He is instructed to use over-the-counter Zyrtec, Flonase, and Mucinex D for symptom relief. He is instructed use over-the-counter Tylenol and Motrin for pain or fever. Instructed to follow-up with his PCP in 3 to 5 days and worsening symptoms. He is agreeable to above plan and denies questions or concerns at this time.       PATIENT REFERRED TO:  Shelley Meckel, LILIAM - CNP  [address] [facility] [address]      DISCHARGE MEDICATIONS:  New Prescriptions    AMOXICILLIN-CLAVULANATE (AUGMENTIN) 875-125 MG PER TABLET    Take 1 tablet by mouth 2 times daily for 7 days    ONDANSETRON (ZOFRAN ODT) 4 MG DISINTEGRATING TABLET    Take 1 tablet by mouth every 8 hours as needed for Nausea or Vomiting       Discontinued Medications    IBUPROFEN (ADVIL;MOTRIN) 800 MG TABLET    Take 1 tablet by mouth every 8 hours as needed for Pain       Current Discharge Medication List          LILIAM Ruby - CNP    (Please note that portions of this note were completed with a voice recognition program. Efforts were made to edit the dictations but occasionally words are mis-transcribed.)           LILIAM Ruby - CNP  10/14/22 4647

## 2022-10-14 NOTE — ED NOTES
Patient discharge instructions given to pt and pt verbalized understanding, 2 px given, no other needs at this time, and pt left in stable condition.       Breanna Cordero RN  10/14/22 4113

## 2022-10-14 NOTE — Clinical Note
Brian Spears was seen and treated in our emergency department on 10/14/2022. He may return to work on 10/16/2022. May be off school or work one to two days if needed. If you have any questions or concerns, please don't hesitate to call.       Zulema Rosas, APRN - CNP

## 2022-10-14 NOTE — ED TRIAGE NOTES
Started 2 days ago, and getting worse,  sinus congestion, threw up at work this morning, continues nauseated, diarrhea, others are sick at home

## 2022-10-19 ENCOUNTER — TELEPHONE (OUTPATIENT)
Dept: FAMILY MEDICINE CLINIC | Age: 37
End: 2022-10-19

## 2022-10-19 NOTE — TELEPHONE ENCOUNTER
FYI-  Patient had 6 mo appt scheduled w you today to follow up on his anxiety and depression. He was wondering if he still needs to see you since he is seeing psych and he feels like his anxiety and depression is well controlled on his current medication. I told patient that if he didn't need anything else today he could cancel and continue w his psych appointments.

## 2022-12-08 NOTE — TELEPHONE ENCOUNTER
Oliver Mariella called into the office stating that he missed his last appt with this provider and he sincerely apologizes, he said that \"its a long story\" but he just went through a really bad breakup and he is now in a custody perkins with his kids mother, so he had to temporarily move to [place] during this time, but would really like to keep this provider. He was informed that while being in [place] he would need to establish there with a PCP/psychiatrist to address his medications, after a 30 day supply is sent in by this provider and then when he moves back (he estimates around 6-7 months); he will get re-established with this provider. He is aware that he would need to call the local [facility] and have them transfer it to a [address] there in [place]. Medication he is requesting is Prozac 40mg;#30 with 1 refill; last with a start date of 10/11/22. He notes that he has a few days left. Medication is pending your approval for a 30 day supply with 0 refills.

## 2022-12-09 NOTE — TELEPHONE ENCOUNTER
Elvi Tipton called into the office to check on status, advised that request is pending to provider. He requests a call from the office when this medication has been sent to the pharmacy. He notes having two Capsules left.

## 2022-12-10 RX ORDER — FLUOXETINE HYDROCHLORIDE 40 MG/1
40 CAPSULE ORAL DAILY
Qty: 30 CAPSULE | Refills: 0 | Status: SHIPPED | OUTPATIENT
Start: 2022-12-10

## 2023-01-10 DIAGNOSIS — F41.9 ANXIETY AND DEPRESSION: ICD-10-CM

## 2023-01-10 DIAGNOSIS — R45.4 IRRITABILITY: ICD-10-CM

## 2023-01-10 DIAGNOSIS — F32.A ANXIETY AND DEPRESSION: ICD-10-CM

## 2023-01-10 NOTE — TELEPHONE ENCOUNTER
Jessica Proctor contacted office, Jessica Proctor is a current patient that has moved to [place] due to a custody perkins with his childrens mother, will be moving back to [facility], unable to establish in [place] to continue his medication due to scheduling out. Asking if provider could refill his medication until the move. Have loaded prozac 40mg.  Abilify 30 pending your approval.

## 2023-01-11 RX ORDER — ARIPIPRAZOLE 30 MG/1
30 TABLET ORAL DAILY
Qty: 90 TABLET | Refills: 0 | Status: SHIPPED | OUTPATIENT
Start: 2023-01-11 | End: 2023-04-11

## 2023-01-11 RX ORDER — FLUOXETINE HYDROCHLORIDE 40 MG/1
40 CAPSULE ORAL DAILY
Qty: 30 CAPSULE | Refills: 0 | Status: SHIPPED | OUTPATIENT
Start: 2023-01-11

## 2023-02-13 RX ORDER — FLUOXETINE HYDROCHLORIDE 40 MG/1
40 CAPSULE ORAL DAILY
Qty: 90 CAPSULE | Refills: 0 | Status: SHIPPED | OUTPATIENT
Start: 2023-02-13

## 2023-02-13 NOTE — TELEPHONE ENCOUNTER
Patient called into the office stating he is out of his Prozac. States he is still living in the [place] area and plans on returning to [place] in the next couple of months. Patient is requesting a refill on Prozac. Please advise. Patient was seen 07/20/2022.     Requested Prescriptions     Pending Prescriptions Disp Refills    FLUoxetine (PROZAC) 40 MG capsule 90 capsule 0     Sig: Take 1 capsule by mouth daily       Date of last visit: 7/20/2022  Date of next visit (if applicable):No future appt  Pharmacy Name: [facility]      Thanks,  Karen Borja, [address] [place], [place] [address]

## 2023-05-08 DIAGNOSIS — F41.9 ANXIETY AND DEPRESSION: ICD-10-CM

## 2023-05-08 DIAGNOSIS — R45.4 IRRITABILITY: ICD-10-CM

## 2023-05-08 DIAGNOSIS — F32.A ANXIETY AND DEPRESSION: ICD-10-CM

## 2023-05-08 NOTE — TELEPHONE ENCOUNTER
Patient called in requesting a refill of his Prozac 40 mg #90 with no refills with a start date of 02/13/223 and his Abilify 30 mg #90 with no refills with a start date of 01/11/23. Last visit 07/20/22  Patient moved back to [place] and is trying to get back in with his original PCP.  He has been out of his medications for 3 days    Pending your approval.

## 2023-05-09 ENCOUNTER — TELEPHONE (OUTPATIENT)
Dept: PSYCHIATRY | Age: 38
End: 2023-05-09

## 2023-05-09 NOTE — TELEPHONE ENCOUNTER
Sherlyn Sibley called into the office stating that he has been out of his Abilify 30mg and Prozac 40mg medication for 5 days and he said that he did not notice at first any symptoms but he is starting to become irritable and is requesting refills. He said that he is still currently in [place] and did not anticipate staying out there this long but he said that he moved there for his son but his mother's surgery was pushed back so he is still living there. He said last time this provider had supplied a 90 day supply back in 01/11/2023 and 02/13/2023 to get him enough medication to establish with care out there but he said everyone is so booked out; he said that he finally received a call back yesterday from his previous PCP and he is calling them today hoping to get in quickly for his refills but its not a for sure thing. I informed him that I would pass the information along to this provider but due to him not being seen since 07/2022 it was not guaranteed; he is asking for a last 30 days until hopefully he can get into his PCP. He is aware that if this provider is willing; he would have to have the Rx sent to [facility] and then transferred; he said that he did not previously. Please advise.

## 2023-05-10 ENCOUNTER — TELEPHONE (OUTPATIENT)
Dept: FAMILY MEDICINE CLINIC | Age: 38
End: 2023-05-10

## 2023-05-10 RX ORDER — ARIPIPRAZOLE 30 MG/1
30 TABLET ORAL DAILY
Qty: 90 TABLET | Refills: 0 | Status: SHIPPED | OUTPATIENT
Start: 2023-05-10 | End: 2023-08-08

## 2023-05-10 RX ORDER — FLUOXETINE HYDROCHLORIDE 40 MG/1
40 CAPSULE ORAL DAILY
Qty: 90 CAPSULE | Refills: 0 | Status: SHIPPED | OUTPATIENT
Start: 2023-05-10

## 2023-05-10 NOTE — TELEPHONE ENCOUNTER
I spoke with Ronaldo Lester and informed him of this information; he understood and said thank you so much. He is aware that he must establish elsewhere in the next 90 days for any additional refills. He was informed that if he ends up coming back to [place] then he must be seen with this provider within 90 days to get refills.

## 2023-10-13 ENCOUNTER — TELEPHONE (OUTPATIENT)
Dept: FAMILY MEDICINE CLINIC | Age: 38
End: 2023-10-13

## 2023-10-13 NOTE — TELEPHONE ENCOUNTER
Unable to process medical records w/o signed OBEY. Attempted to call patient but VM is not set up.  LM with health organization stating that OBEY needs signed prior to records being sent

## 2023-10-13 NOTE — TELEPHONE ENCOUNTER
----- Message from Jakesharonda Sharp sent at 10/12/2023  2:59 PM EDT -----  Subject: Message to Provider    QUESTIONS  Information for Provider? Pt needs to have his records sent to his new PCP   office in [place]. Please fax to [phone], Isma Zhong, phone #   [phone], ext [phone]. [address]  ---------------------------------------------------------------------------  --------------  Jennifer Derby Lizzeth  [phone]; OK to leave message on voicemail  ---------------------------------------------------------------------------  --------------  SCRIPT ANSWERS  Relationship to Patient?  Self

## 2023-12-02 ENCOUNTER — HOSPITAL ENCOUNTER (EMERGENCY)
Dept: HOSPITAL 19 - COL.ER | Age: 38
Discharge: HOME | End: 2023-12-02
Attending: PHYSICIAN ASSISTANT
Payer: COMMERCIAL

## 2023-12-02 VITALS — DIASTOLIC BLOOD PRESSURE: 82 MMHG | SYSTOLIC BLOOD PRESSURE: 124 MMHG | HEART RATE: 88 BPM

## 2023-12-02 VITALS — WEIGHT: 14.99 LBS | BODY MASS INDEX: 2.15 KG/M2 | HEIGHT: 70 IN

## 2023-12-02 VITALS — TEMPERATURE: 98.6 F
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
## 2023-12-02 DIAGNOSIS — Z87.891: ICD-10-CM

## 2023-12-02 DIAGNOSIS — S06.0XAA: Primary | ICD-10-CM

## 2024-01-14 ENCOUNTER — HOSPITAL ENCOUNTER (EMERGENCY)
Dept: HOSPITAL 19 - COL.ER | Age: 39
Discharge: HOME | End: 2024-01-14
Attending: STUDENT IN AN ORGANIZED HEALTH CARE EDUCATION/TRAINING PROGRAM
Payer: COMMERCIAL

## 2024-01-14 VITALS — HEART RATE: 81 BPM | DIASTOLIC BLOOD PRESSURE: 72 MMHG | TEMPERATURE: 98.1 F | SYSTOLIC BLOOD PRESSURE: 124 MMHG

## 2024-01-14 VITALS — HEIGHT: 70 IN | BODY MASS INDEX: 22.25 KG/M2 | WEIGHT: 155.43 LBS

## 2024-01-14 DIAGNOSIS — R11.2: Primary | ICD-10-CM

## 2024-01-14 DIAGNOSIS — R19.7: ICD-10-CM

## 2024-01-14 LAB
ALBUMIN SERPL-MCNC: 4.2 GM/DL (ref 3.5–5)
ALP SERPL-CCNC: 50 U/L (ref 40–150)
ALT SERPL-CCNC: 19 U/L (ref 0–55)
ANION GAP SERPL CALC-SCNC: 12 MMOL/L (ref 7–16)
AST SERPL-CCNC: 18 U/L (ref 5–34)
BASOPHILS # BLD: 0 K/MM3 (ref 0–0.2)
BASOPHILS NFR BLD AUTO: 0.2 % (ref 0–2)
BILIRUB SERPL-MCNC: 0.7 MG/DL (ref 0.2–1.2)
BUN SERPL-MCNC: 12 MG/DL (ref 9–21)
CALCIUM SERPL-MCNC: 9.5 MG/DL (ref 8.4–10.2)
CHLORIDE SERPL-SCNC: 107 MMOL/L (ref 98–107)
CO2 SERPL-SCNC: 21 MMOL/L (ref 22–29)
CREAT SERPL-SCNC: 0.98 MG/DL (ref 0.72–1.25)
EOSINOPHIL # BLD: 0 K/MM3 (ref 0–0.7)
EOSINOPHIL NFR BLD: 0.2 % (ref 0–4)
ERYTHROCYTE [DISTWIDTH] IN BLOOD BY AUTOMATED COUNT: 11.7 % (ref 11.5–14.5)
GLUCOSE SERPL-MCNC: 109 MG/DL (ref 70–99)
GRANULOCYTES # BLD AUTO: 80.4 % (ref 42.2–75.2)
HCT VFR BLD AUTO: 44.9 % (ref 42–52)
HGB BLD-MCNC: 15.7 G/DL (ref 13.5–18)
LIPASE SERPL-CCNC: 18 U/L (ref 8–78)
LYMPHOCYTES # BLD: 0.2 K/MM3 (ref 1.2–3.4)
LYMPHOCYTES NFR BLD: 4.7 % (ref 20–51)
MCH RBC QN AUTO: 33 PG (ref 27–31)
MCHC RBC AUTO-ENTMCNC: 35 G/DL (ref 33–37)
MCV RBC AUTO: 94 FL (ref 80–100)
MONOCYTES # BLD: 0.7 K/MM3 (ref 0.1–0.6)
MONOCYTES NFR BLD AUTO: 14.3 % (ref 1.7–9.3)
NEUTROPHILS # BLD: 4.1 K/MM3 (ref 1.4–6.5)
PH UR STRIP.AUTO: 7 [PH] (ref 5–8.5)
PLATELET # BLD AUTO: 178 K/MM3 (ref 130–400)
PMV BLD AUTO: 10.7 FL (ref 7.4–10.4)
POTASSIUM SERPL-SCNC: 3.4 MMOL/L (ref 3.5–4.5)
PROT SERPL-MCNC: 7.2 GM/DL (ref 6.2–8.1)
RBC # BLD AUTO: 4.76 M/MM3 (ref 4.2–5.6)
RBC # UR STRIP.AUTO: (no result) /UL
RBC # UR: (no result) /HPF (ref 0–2)
SODIUM SERPL-SCNC: 140 MMOL/L (ref 136–145)
SP GR UR STRIP.AUTO: 1.01 (ref 1–1.03)
SQUAMOUS # URNS: (no result) /HPF (ref 0–10)
UA DIPSTICK PNL UR STRIP.AUTO: (no result)
URN COLLECT METHOD CLASS: (no result)
UROBILINOGEN UR STRIP.AUTO-MCNC: 0.2 E.U/DL (ref 0.2–1)